# Patient Record
Sex: FEMALE | Race: WHITE | NOT HISPANIC OR LATINO | Employment: OTHER | URBAN - METROPOLITAN AREA
[De-identification: names, ages, dates, MRNs, and addresses within clinical notes are randomized per-mention and may not be internally consistent; named-entity substitution may affect disease eponyms.]

---

## 2017-01-31 RX ORDER — SAXAGLIPTIN 5 MG/1
TABLET, FILM COATED ORAL
Qty: 30 TABLET | Refills: 0 | Status: SHIPPED | OUTPATIENT
Start: 2017-01-31 | End: 2017-03-03 | Stop reason: SDUPTHER

## 2017-03-05 RX ORDER — SAXAGLIPTIN 5 MG/1
TABLET, FILM COATED ORAL
Qty: 30 TABLET | Refills: 0 | Status: SHIPPED | OUTPATIENT
Start: 2017-03-05 | End: 2017-03-31 | Stop reason: SDUPTHER

## 2017-03-27 ENCOUNTER — LAB VISIT (OUTPATIENT)
Dept: LAB | Facility: HOSPITAL | Age: 67
End: 2017-03-27
Attending: INTERNAL MEDICINE
Payer: MEDICARE

## 2017-03-27 DIAGNOSIS — E55.9 UNSPECIFIED VITAMIN D DEFICIENCY: ICD-10-CM

## 2017-03-27 DIAGNOSIS — E21.3 HYPERPARATHYROIDISM, UNSPECIFIED: ICD-10-CM

## 2017-03-27 DIAGNOSIS — M89.9 OSTEOPATHY: ICD-10-CM

## 2017-03-27 DIAGNOSIS — E04.1 THYROID NODULE: ICD-10-CM

## 2017-03-27 LAB
25(OH)D3+25(OH)D2 SERPL-MCNC: 21 NG/ML
ALBUMIN SERPL BCP-MCNC: 3.5 G/DL
ALP SERPL-CCNC: 53 U/L
ALT SERPL W/O P-5'-P-CCNC: 15 U/L
ANION GAP SERPL CALC-SCNC: 10 MMOL/L
AST SERPL-CCNC: 15 U/L
BILIRUB SERPL-MCNC: 0.5 MG/DL
BUN SERPL-MCNC: 24 MG/DL
CALCIUM SERPL-MCNC: 10.4 MG/DL
CHLORIDE SERPL-SCNC: 105 MMOL/L
CO2 SERPL-SCNC: 26 MMOL/L
CORTIS SERPL-MCNC: 10.3 UG/DL
CREAT SERPL-MCNC: 1 MG/DL
EST. GFR  (AFRICAN AMERICAN): >60 ML/MIN/1.73 M^2
EST. GFR  (NON AFRICAN AMERICAN): 57.6 ML/MIN/1.73 M^2
GLUCOSE SERPL-MCNC: 103 MG/DL
POTASSIUM SERPL-SCNC: 3.6 MMOL/L
PROT SERPL-MCNC: 6.8 G/DL
PTH-INTACT SERPL-MCNC: 109 PG/ML
SODIUM SERPL-SCNC: 141 MMOL/L
T4 FREE SERPL-MCNC: 1.02 NG/DL
TSH SERPL DL<=0.005 MIU/L-ACNC: 2.74 UIU/ML

## 2017-03-27 PROCEDURE — 82024 ASSAY OF ACTH: CPT

## 2017-03-27 PROCEDURE — 83970 ASSAY OF PARATHORMONE: CPT

## 2017-03-27 PROCEDURE — 82306 VITAMIN D 25 HYDROXY: CPT

## 2017-03-27 PROCEDURE — 80053 COMPREHEN METABOLIC PANEL: CPT

## 2017-03-27 PROCEDURE — 36415 COLL VENOUS BLD VENIPUNCTURE: CPT | Mod: PO

## 2017-03-27 PROCEDURE — 84439 ASSAY OF FREE THYROXINE: CPT

## 2017-03-27 PROCEDURE — 84443 ASSAY THYROID STIM HORMONE: CPT

## 2017-03-27 PROCEDURE — 82533 TOTAL CORTISOL: CPT

## 2017-03-28 LAB
ACTH PLAS-MCNC: 54 PG/ML
CALCIT SERPL-MCNC: <5 PG/ML

## 2017-03-31 RX ORDER — SAXAGLIPTIN 5 MG/1
TABLET, FILM COATED ORAL
Qty: 30 TABLET | Refills: 0 | Status: SHIPPED | OUTPATIENT
Start: 2017-03-31 | End: 2017-05-04 | Stop reason: SDUPTHER

## 2017-04-20 RX ORDER — ATORVASTATIN CALCIUM 20 MG/1
TABLET, FILM COATED ORAL
Qty: 30 TABLET | Refills: 12 | Status: SHIPPED | OUTPATIENT
Start: 2017-04-20 | End: 2018-01-26 | Stop reason: SDUPTHER

## 2017-05-04 RX ORDER — SAXAGLIPTIN 5 MG/1
TABLET, FILM COATED ORAL
Qty: 30 TABLET | Refills: 6 | Status: SHIPPED | OUTPATIENT
Start: 2017-05-04 | End: 2017-10-28 | Stop reason: SDUPTHER

## 2017-06-16 DIAGNOSIS — E11.9 TYPE 2 DIABETES MELLITUS WITHOUT COMPLICATION: ICD-10-CM

## 2017-08-02 ENCOUNTER — OFFICE VISIT (OUTPATIENT)
Dept: FAMILY MEDICINE | Facility: CLINIC | Age: 67
End: 2017-08-02
Payer: MEDICARE

## 2017-08-02 ENCOUNTER — LAB VISIT (OUTPATIENT)
Dept: LAB | Facility: HOSPITAL | Age: 67
End: 2017-08-02
Attending: NURSE PRACTITIONER
Payer: MEDICARE

## 2017-08-02 VITALS
HEIGHT: 60 IN | DIASTOLIC BLOOD PRESSURE: 68 MMHG | HEART RATE: 57 BPM | OXYGEN SATURATION: 97 % | SYSTOLIC BLOOD PRESSURE: 130 MMHG | WEIGHT: 229.06 LBS | TEMPERATURE: 98 F | BODY MASS INDEX: 44.97 KG/M2

## 2017-08-02 DIAGNOSIS — Z11.59 NEED FOR HEPATITIS C SCREENING TEST: ICD-10-CM

## 2017-08-02 DIAGNOSIS — R59.0 CERVICAL ADENOPATHY: Primary | ICD-10-CM

## 2017-08-02 DIAGNOSIS — E55.9 UNSPECIFIED VITAMIN D DEFICIENCY: ICD-10-CM

## 2017-08-02 DIAGNOSIS — R21 RASH: ICD-10-CM

## 2017-08-02 DIAGNOSIS — E21.3 HYPERPARATHYROIDISM: ICD-10-CM

## 2017-08-02 DIAGNOSIS — M81.0 OSTEOPOROSIS: ICD-10-CM

## 2017-08-02 DIAGNOSIS — E04.1 THYROID NODULE: ICD-10-CM

## 2017-08-02 DIAGNOSIS — E21.3 HYPERPARATHYROIDISM, UNSPECIFIED: ICD-10-CM

## 2017-08-02 DIAGNOSIS — R30.0 DYSURIA: ICD-10-CM

## 2017-08-02 LAB
25(OH)D3+25(OH)D2 SERPL-MCNC: 30 NG/ML
ALBUMIN SERPL BCP-MCNC: 3.6 G/DL
ALP SERPL-CCNC: 49 U/L
ALT SERPL W/O P-5'-P-CCNC: 17 U/L
ANION GAP SERPL CALC-SCNC: 10 MMOL/L
AST SERPL-CCNC: 13 U/L
BILIRUB SERPL-MCNC: 0.6 MG/DL
BUN SERPL-MCNC: 36 MG/DL
CALCIUM SERPL-MCNC: 10.9 MG/DL
CHLORIDE SERPL-SCNC: 103 MMOL/L
CHOLEST/HDLC SERPL: 4.6 {RATIO}
CO2 SERPL-SCNC: 27 MMOL/L
CORTIS SERPL-MCNC: 12.1 UG/DL
CREAT SERPL-MCNC: 1.1 MG/DL
EST. GFR  (AFRICAN AMERICAN): 59.2 ML/MIN/1.73 M^2
EST. GFR  (NON AFRICAN AMERICAN): 51.3 ML/MIN/1.73 M^2
GLUCOSE SERPL-MCNC: 144 MG/DL
HDL/CHOLESTEROL RATIO: 22 %
HDLC SERPL-MCNC: 173 MG/DL
HDLC SERPL-MCNC: 38 MG/DL
LDLC SERPL CALC-MCNC: 101.8 MG/DL
NONHDLC SERPL-MCNC: 135 MG/DL
POTASSIUM SERPL-SCNC: 4.5 MMOL/L
PROT SERPL-MCNC: 6.9 G/DL
PTH-INTACT SERPL-MCNC: 136 PG/ML
SODIUM SERPL-SCNC: 140 MMOL/L
T4 FREE SERPL-MCNC: 1.01 NG/DL
TRIGL SERPL-MCNC: 166 MG/DL
TSH SERPL DL<=0.005 MIU/L-ACNC: 1.63 UIU/ML

## 2017-08-02 PROCEDURE — 4010F ACE/ARB THERAPY RXD/TAKEN: CPT | Mod: ,,, | Performed by: NURSE PRACTITIONER

## 2017-08-02 PROCEDURE — 84443 ASSAY THYROID STIM HORMONE: CPT

## 2017-08-02 PROCEDURE — 84439 ASSAY OF FREE THYROXINE: CPT

## 2017-08-02 PROCEDURE — 83970 ASSAY OF PARATHORMONE: CPT

## 2017-08-02 PROCEDURE — 80061 LIPID PANEL: CPT

## 2017-08-02 PROCEDURE — 82306 VITAMIN D 25 HYDROXY: CPT

## 2017-08-02 PROCEDURE — 80053 COMPREHEN METABOLIC PANEL: CPT

## 2017-08-02 PROCEDURE — 99999 PR PBB SHADOW E&M-EST. PATIENT-LVL IV: CPT | Mod: PBBFAC,,, | Performed by: NURSE PRACTITIONER

## 2017-08-02 PROCEDURE — 1159F MED LIST DOCD IN RCRD: CPT | Mod: ,,, | Performed by: NURSE PRACTITIONER

## 2017-08-02 PROCEDURE — 82533 TOTAL CORTISOL: CPT

## 2017-08-02 PROCEDURE — 83036 HEMOGLOBIN GLYCOSYLATED A1C: CPT

## 2017-08-02 PROCEDURE — 99214 OFFICE O/P EST MOD 30 MIN: CPT | Mod: S$PBB,,, | Performed by: NURSE PRACTITIONER

## 2017-08-02 PROCEDURE — 82024 ASSAY OF ACTH: CPT

## 2017-08-02 PROCEDURE — 3045F PR MOST RECENT HEMOGLOBIN A1C LEVEL 7.0-9.0%: CPT | Mod: ,,, | Performed by: NURSE PRACTITIONER

## 2017-08-02 RX ORDER — TRIAMCINOLONE ACETONIDE 1 MG/G
0.1 CREAM TOPICAL 2 TIMES DAILY
COMMUNITY
End: 2023-04-27

## 2017-08-02 RX ORDER — CLOBETASOL PROPIONATE 0.46 MG/ML
0.05 SOLUTION TOPICAL 2 TIMES DAILY
COMMUNITY
End: 2024-01-19 | Stop reason: SDUPTHER

## 2017-08-02 RX ORDER — SULFAMETHOXAZOLE AND TRIMETHOPRIM 800; 160 MG/1; MG/1
1 TABLET ORAL 2 TIMES DAILY
Qty: 14 TABLET | Refills: 0 | Status: SHIPPED | OUTPATIENT
Start: 2017-08-02 | End: 2017-08-09

## 2017-08-02 NOTE — PROGRESS NOTES
This dictation has been generated using Dragon Dictation some phonetic errors may occur.     Andrade CARMONA was seen today for glands in neck swollen and urinary tract infection.    Diagnoses and all orders for this visit:    Cervical adenopathy  Reactive lymph node due to rash.  Continue to monitor.  Rash  Rash on neck predominantly eczema.  Continue current therapy from Dr. Barajas.  Rash on the chest suspicious for shingles because of pattern however no other convincing symptoms for shingles.  She will be on a antibiotic for a urinary tract infection so we will see if the rash responds to that.  Discontinue the steroid creams.  Steroid creams seem to have worsened per patient report.    Dysuria  -     Urinalysis; Future  -     Urine culture; Future  Dysuria.  Therapy for UTI.  Check urinalysis and urine culture as above.  I'll review results when available.    Need for hepatitis C screening test  -     Hepatitis C antibody; Future  Patient asked about doing hepatitis C tests.  She is low risk.  Her age group is at risk for development of hepatitis C.    Uncontrolled type 2 diabetes mellitus with stage 3 chronic kidney disease, with long-term current use of insulin  -     Protein / creatinine ratio, urine; Future  Uncontrolled diabetes with chronic kidney disease.  Check protein level as above.  I'll review and address accordingly.  Follows with endocrine Dr. Martínez.  She states she has labs placed and we'll get those done at today's visit.    Hyperparathyroidism  Hyperparathyroidism secondary to renal disease.  Continue to monitor.  Patient follows with endocrinology.  Other orders  -     sulfamethoxazole-trimethoprim 800-160mg (BACTRIM DS) 800-160 mg Tab; Take 1 tablet by mouth 2 (two) times daily.      Follow-up with me in one week and reevaluate rash.  Also reviewed labs with patient.    Return in about 1 week (around  8/9/2017).      ________________________________________________________________  ________________________________________________________________        Chief Complaint   Patient presents with    glands in neck swollen    Urinary Tract Infection     burning     History of present illness  This 67 y.o. presents today for complaint of multiple complaints.  Patient notes some gland swelling, rash on the neck, burning with urination, rash on the left chest wall, and chronic medical disease.  Patient is gland swelling on the posterior neck.  Symptoms have been present for a couple of months.  She had a rash problem on the back of the neck.  She saw dermatology Dr. Barajas and was prescribed some steroid creams.  The rash is improving but not resolved.  The glands have become smaller but are still present.  She notes one posterior cervical lymph node on the right and one in the scalp on the left.  Patient also notes a rash on the left chest wall.  She had been putting steroids on the affected area.  She indicates this become more red and started to itch.  She denies any burning or pain.  She has had shingles before but states this feels different.  Burning with urination.  Patient notes urinary frequency and dysuria.  She's had some urinary incontinence.  She was taking a medication for overactive bladder which has not been helpful however recent symptoms consistent with UTI.  I explained to her that no medicine would be effective if she indeed has a urinary tract infection.  We will be adjusting that medication today we can discuss this at a later point.  We'll need to evaluate her urine and we'll treat her with an antibiotic empirically.  Patient has a history of diabetes.  She follows with Dr. Martínez who has ordered CMP, lipid, A1c, free T4, TSH, ACTH, cortisol, PTH, vitamin D 25, and calcitonin.  Patient indicates that she goes down stairs and they always link the labs.  She does see Dr. Hadley for her eyes.   [facility].  ROS:   CONST: weight stable.  EYES: no vision change.  ENT: No sore throat, headache, or earache.  CV: no chest pain w/ exertion.  RESP: No shortness of breath.  GI: no nausea, vomiting, diarrhea. No dysphagia. Appetite good.   : As stated above.  MUSCULOSKELETAL: no new myalgias or arthralgias.  SKIN: Rash as above no rash elsewhere.  NEURO: no focal deficits. Denies headache.  PSYCH: no new issues.  ENDOCRINE: no polyuria.  HEME: no lymph nodes.  ALLERGY: no general pruritis.      Past medical and social history reviewed    Past Medical History:   Diagnosis Date    Anticoagulant long-term use     Plavix    Arthritis     Diabetes mellitus type II     Diabetes mellitus with renal manifestations, uncontrolled 5/5/2015    Edema leg     Chronic    High-density lipoprotein deficiency 2/24/2014    History of acute post-streptococcal glomerulonephritis     Hyperlipidemia     Hyperparathyroidism 2/24/2014    Hypertension     Obesity     Sleep apnea     Stroke approx 2013    CVA or TIA--    Thyroid disease     Vitamin D deficiency disease 2/24/2014       Past Surgical History:   Procedure Laterality Date    APPENDECTOMY      HYSTERECTOMY      JOINT REPLACEMENT Right     knee    rectum surgery      to stop bleeding    Sleep apnea Surgery      THYROIDECTOMY, PARTIAL  4/2005    80% of the Left and All of the Right    TONSILLECTOMY         Family History   Problem Relation Age of Onset    Cancer Mother      Breast    Cancer Father      Stomach       Social History     Social History    Marital status:      Spouse name: N/A    Number of children: N/A    Years of education: N/A     Social History Main Topics    Smoking status: Never Smoker    Smokeless tobacco: Never Used    Alcohol use Yes      Comment: rarely    Drug use: No    Sexual activity: Not Asked     Other Topics Concern    None     Social History Narrative    None       Current Outpatient Prescriptions  "  Medication Sig Dispense Refill    alendronate (FOSAMAX) 70 MG tablet TAKE 1 TABLET ONCE PER WEEK  1    atorvastatin (LIPITOR) 20 MG tablet TAKE 1 TABLET(20 MG) BY MOUTH EVERY DAY 30 tablet 12    BD ULTRA-FINE REYNA PEN NEEDLES 32 x 5/32 " Ndle       CARTIA  mg 24 hr capsule TAKE 1 CAPSULE BY MOUTH EVERY DAY 30 capsule 12    clobetasol (TEMOVATE) 0.05 % external solution Apply 0.05 % topically 2 (two) times daily.      clopidogrel (PLAVIX) 75 mg tablet Take 1 tablet (75 mg total) by mouth once daily. 30 tablet 12    CONTOUR TEST STRIPS Strp   4    ergocalciferol (ERGOCALCIFEROL) 50,000 unit Cap Take 50,000 Units by mouth every 7 days.       glipiZIDE (GLUCOTROL) 10 MG TR24 Take 1 tablet (10 mg total) by mouth once daily. 30 tablet 3    hydrochlorothiazide (HYDRODIURIL) 25 MG tablet Take 1 tablet (25 mg total) by mouth once daily. 30 tablet 12    insulin detemir (LEVEMIR FLEXPEN) 100 unit/mL (3 mL) SubQ InPn pen Inject 10-15 units nightly 100 mL 12    insulin lispro (HUMALOG) 100 unit/mL injection Inject into the skin 2 (two) times daily with meals. Uses a sliding scale      omega-3 fatty acids-vitamin E (FISH OIL) 1,000 mg Cap Take 1 capsule by mouth 3 (three) times daily. 1 Capsule Oral Twice a day (Patient taking differently: Take 2 capsules by mouth Daily. 1 Capsule Oral Twice a day) 90 each 0    ONGLYZA 5 mg Tab tablet TAKE 1 TABLET(5 MG) BY MOUTH EVERY DAY 30 tablet 6    oxybutynin (DITROPAN-XL) 5 MG TR24 Take 1 tablet (5 mg total) by mouth once daily. 30 tablet 11    telmisartan (MICARDIS) 80 MG Tab Take 1 tablet (80 mg total) by mouth once daily. 30 tablet 12    tramadol (ULTRAM) 50 mg tablet TAKE 1 TO 2 TABLET BY MOUTH EVERY 6 HOURS AS NEEDED 60 tablet 3    triamcinolone acetonide 0.1% (KENALOG) 0.1 % cream Apply 0.1 each topically 2 (two) times daily.      sulfamethoxazole-trimethoprim 800-160mg (BACTRIM DS) 800-160 mg Tab Take 1 tablet by mouth 2 (two) times daily. 14 tablet 0 "     No current facility-administered medications for this visit.        Review of patient's allergies indicates:  No Known Allergies    Physical examination  Vitals Reviewed  Gen. Well-dressed well-nourished no apparent distress  Skin warm dry and intact.  Trace rash noted at the base of the skull involving the scalp.  Also rash noted on the left chest wall.  He does have a linear pattern.  It is slightly erythematous.  Doesn't really have an annular pattern.  Neck is supple with one posterior cervical node noted on the right.  Very small.  Chest.  Respirations are even unlabored.  Lungs are clear to auscultation.  Cardiac regular rate and rhythm.  1 left axillary node noted tender and mobile.  No adenopathy noted above or below the clavicle.  Neuro. Awake alert oriented x4.  Normal judgment and cognition noted.  Extremities no clubbing cyanosis or edema noted.     Call or return to clinic prn if these symptoms worsen or fail to improve as anticipated.

## 2017-08-03 LAB
ESTIMATED AVG GLUCOSE: 163 MG/DL
HBA1C MFR BLD HPLC: 7.3 %

## 2017-08-04 ENCOUNTER — TELEPHONE (OUTPATIENT)
Dept: ADMINISTRATIVE | Facility: HOSPITAL | Age: 67
End: 2017-08-04

## 2017-08-04 LAB
ACTH PLAS-MCNC: 60 PG/ML
CALCIT SERPL-MCNC: <5 PG/ML

## 2017-08-04 NOTE — TELEPHONE ENCOUNTER
Received from [facility], Joselyn Hadley MD appointment 3/15/2017 diabetic eye exam.  Updated health maintenance and sent to scanning.

## 2017-08-07 ENCOUNTER — TELEPHONE (OUTPATIENT)
Dept: FAMILY MEDICINE | Facility: CLINIC | Age: 67
End: 2017-08-07

## 2017-08-07 NOTE — TELEPHONE ENCOUNTER
Urine test shows UTI from E.Coli. Bactrim is noted to be good choice on sensitivities. Complete med.

## 2017-08-09 ENCOUNTER — OFFICE VISIT (OUTPATIENT)
Dept: FAMILY MEDICINE | Facility: CLINIC | Age: 67
End: 2017-08-09
Payer: MEDICARE

## 2017-08-09 VITALS
HEART RATE: 89 BPM | WEIGHT: 227.75 LBS | DIASTOLIC BLOOD PRESSURE: 52 MMHG | SYSTOLIC BLOOD PRESSURE: 118 MMHG | HEIGHT: 60 IN | BODY MASS INDEX: 44.72 KG/M2 | OXYGEN SATURATION: 94 %

## 2017-08-09 DIAGNOSIS — N39.0 URINARY TRACT INFECTION WITHOUT HEMATURIA, SITE UNSPECIFIED: Primary | ICD-10-CM

## 2017-08-09 DIAGNOSIS — R21 RASH: ICD-10-CM

## 2017-08-09 PROCEDURE — 99213 OFFICE O/P EST LOW 20 MIN: CPT | Mod: S$PBB,,, | Performed by: NURSE PRACTITIONER

## 2017-08-09 PROCEDURE — 1159F MED LIST DOCD IN RCRD: CPT | Mod: ,,, | Performed by: NURSE PRACTITIONER

## 2017-08-09 PROCEDURE — 3074F SYST BP LT 130 MM HG: CPT | Mod: ,,, | Performed by: NURSE PRACTITIONER

## 2017-08-09 PROCEDURE — 99214 OFFICE O/P EST MOD 30 MIN: CPT | Mod: PBBFAC,PO | Performed by: NURSE PRACTITIONER

## 2017-08-09 PROCEDURE — 3008F BODY MASS INDEX DOCD: CPT | Mod: ,,, | Performed by: NURSE PRACTITIONER

## 2017-08-09 PROCEDURE — 3078F DIAST BP <80 MM HG: CPT | Mod: ,,, | Performed by: NURSE PRACTITIONER

## 2017-08-09 PROCEDURE — 99999 PR PBB SHADOW E&M-EST. PATIENT-LVL IV: CPT | Mod: PBBFAC,,, | Performed by: NURSE PRACTITIONER

## 2017-08-09 NOTE — PROGRESS NOTES
This dictation has been generated using Dragon Dictation some phonetic errors may occur.     Andrade CARMONA was seen today for rash.    Diagnoses and all orders for this visit:    Urinary tract infection without hematuria, site unspecified    Rash      UTI resolved  Rash improving  Topical Neosporin and continue to monitor for week.  Follow-up if symptoms aren't resolved.    Return if symptoms worsen or fail to improve.      ________________________________________________________________  ________________________________________________________________        Chief Complaint   Patient presents with    Rash     History of present illness  This 67 y.o. presents today for complaint of follow-up on rash.  I saw this patient recently.  She had a rash on her neck and chest.  She indicates that the rash is resolved on the  the neck.  Gland swelling on the neck is resolved.   rash on the chest wall has improved dramatically.  She stopped using the steroid cream.  She completes the antibiotic today.  I suggested she use in his port on the affected area and see how that improves over the next week.  Review of systems  No fever or chills  No oral vaginal lesions  No new rash    Past medical and social history reviewed     Past Medical History:   Diagnosis Date    Anticoagulant long-term use     Plavix    Arthritis     Diabetes mellitus type II     Diabetes mellitus with renal manifestations, uncontrolled 5/5/2015    Edema leg     Chronic    High-density lipoprotein deficiency 2/24/2014    History of acute post-streptococcal glomerulonephritis     Hyperlipidemia     Hyperparathyroidism 2/24/2014    Hypertension     Obesity     Sleep apnea     Stroke approx 2013    CVA or TIA--    Thyroid disease     Vitamin D deficiency disease 2/24/2014       Past Surgical History:   Procedure Laterality Date    APPENDECTOMY      HYSTERECTOMY      JOINT REPLACEMENT Right     knee    rectum surgery      to stop bleeding     "Sleep apnea Surgery      THYROIDECTOMY, PARTIAL  4/2005    80% of the Left and All of the Right    TONSILLECTOMY         Family History   Problem Relation Age of Onset    Cancer Mother      Breast    Cancer Father      Stomach       Social History     Social History    Marital status:      Spouse name: N/A    Number of children: N/A    Years of education: N/A     Social History Main Topics    Smoking status: Never Smoker    Smokeless tobacco: Never Used    Alcohol use Yes      Comment: rarely    Drug use: No    Sexual activity: Not Asked     Other Topics Concern    None     Social History Narrative    None       Current Outpatient Prescriptions   Medication Sig Dispense Refill    alendronate (FOSAMAX) 70 MG tablet TAKE 1 TABLET ONCE PER WEEK  1    atorvastatin (LIPITOR) 20 MG tablet TAKE 1 TABLET(20 MG) BY MOUTH EVERY DAY 30 tablet 12    BD ULTRA-FINE REYNA PEN NEEDLES 32 x 5/32 " Ndle       CARTIA  mg 24 hr capsule TAKE 1 CAPSULE BY MOUTH EVERY DAY 30 capsule 12    clobetasol (TEMOVATE) 0.05 % external solution Apply 0.05 % topically 2 (two) times daily.      clopidogrel (PLAVIX) 75 mg tablet Take 1 tablet (75 mg total) by mouth once daily. 30 tablet 12    CONTOUR TEST STRIPS Strp   4    ergocalciferol (ERGOCALCIFEROL) 50,000 unit Cap Take 50,000 Units by mouth every 7 days.       glipiZIDE (GLUCOTROL) 10 MG TR24 Take 1 tablet (10 mg total) by mouth once daily. 30 tablet 3    hydrochlorothiazide (HYDRODIURIL) 25 MG tablet Take 1 tablet (25 mg total) by mouth once daily. 30 tablet 12    insulin detemir (LEVEMIR FLEXPEN) 100 unit/mL (3 mL) SubQ InPn pen Inject 10-15 units nightly 100 mL 12    insulin lispro (HUMALOG) 100 unit/mL injection Inject into the skin 2 (two) times daily with meals. Uses a sliding scale      omega-3 fatty acids-vitamin E (FISH OIL) 1,000 mg Cap Take 1 capsule by mouth 3 (three) times daily. 1 Capsule Oral Twice a day (Patient taking differently: Take 2 " capsules by mouth Daily. 1 Capsule Oral Twice a day) 90 each 0    ONGLYZA 5 mg Tab tablet TAKE 1 TABLET(5 MG) BY MOUTH EVERY DAY 30 tablet 6    oxybutynin (DITROPAN-XL) 5 MG TR24 Take 1 tablet (5 mg total) by mouth once daily. 30 tablet 11    sulfamethoxazole-trimethoprim 800-160mg (BACTRIM DS) 800-160 mg Tab Take 1 tablet by mouth 2 (two) times daily. 14 tablet 0    telmisartan (MICARDIS) 80 MG Tab Take 1 tablet (80 mg total) by mouth once daily. 30 tablet 12    tramadol (ULTRAM) 50 mg tablet TAKE 1 TO 2 TABLET BY MOUTH EVERY 6 HOURS AS NEEDED 60 tablet 3    triamcinolone acetonide 0.1% (KENALOG) 0.1 % cream Apply 0.1 each topically 2 (two) times daily.       No current facility-administered medications for this visit.        Review of patient's allergies indicates:  No Known Allergies    Physical examination  Vitals Reviewed  Gen. Well-dressed well-nourished no apparent distress  Skin warm dry and intact.  No rashes noted on the neck.  Decrease in the erythematous presentation of the rash on the left chest wall.  Overall improvement noted.  Neck is supple without adenopathy  Chest.  Respirations are even unlabored.  Lungs are clear to auscultation.  Cardiac regular rate and rhythm.  No chest wall adenopathy noted.  Neuro. Awake alert oriented x4.  Normal judgment and cognition noted.  Extremities no clubbing cyanosis or edema noted.     Call or return to clinic prn if these symptoms worsen or fail to improve as anticipated.

## 2017-09-06 RX ORDER — DILTIAZEM HYDROCHLORIDE 240 MG/1
CAPSULE, EXTENDED RELEASE ORAL
Qty: 30 CAPSULE | Refills: 0 | Status: SHIPPED | OUTPATIENT
Start: 2017-09-06 | End: 2017-09-30 | Stop reason: SDUPTHER

## 2017-10-02 RX ORDER — DILTIAZEM HYDROCHLORIDE 240 MG/1
CAPSULE, EXTENDED RELEASE ORAL
Qty: 30 CAPSULE | Refills: 0 | Status: SHIPPED | OUTPATIENT
Start: 2017-10-02 | End: 2017-10-28 | Stop reason: SDUPTHER

## 2017-10-28 RX ORDER — CLOPIDOGREL BISULFATE 75 MG/1
TABLET ORAL
Qty: 30 TABLET | Refills: 0 | Status: SHIPPED | OUTPATIENT
Start: 2017-10-28 | End: 2017-11-30 | Stop reason: SDUPTHER

## 2017-10-28 RX ORDER — TELMISARTAN 80 MG/1
TABLET ORAL
Qty: 30 TABLET | Refills: 0 | Status: SHIPPED | OUTPATIENT
Start: 2017-10-28 | End: 2017-11-30 | Stop reason: SDUPTHER

## 2017-10-28 RX ORDER — DILTIAZEM HYDROCHLORIDE 240 MG/1
CAPSULE, EXTENDED RELEASE ORAL
Qty: 30 CAPSULE | Refills: 0 | Status: SHIPPED | OUTPATIENT
Start: 2017-10-28 | End: 2017-11-30 | Stop reason: SDUPTHER

## 2017-10-29 RX ORDER — SAXAGLIPTIN 5 MG/1
TABLET, FILM COATED ORAL
Qty: 30 TABLET | Refills: 0 | Status: SHIPPED | OUTPATIENT
Start: 2017-10-29 | End: 2018-01-11 | Stop reason: SDUPTHER

## 2017-11-30 RX ORDER — DILTIAZEM HYDROCHLORIDE 240 MG/1
CAPSULE, EXTENDED RELEASE ORAL
Qty: 30 CAPSULE | Refills: 0 | Status: SHIPPED | OUTPATIENT
Start: 2017-11-30 | End: 2017-12-23 | Stop reason: SDUPTHER

## 2017-11-30 RX ORDER — TELMISARTAN 80 MG/1
TABLET ORAL
Qty: 30 TABLET | Refills: 0 | Status: SHIPPED | OUTPATIENT
Start: 2017-11-30 | End: 2018-01-24 | Stop reason: SDUPTHER

## 2017-11-30 RX ORDER — CLOPIDOGREL BISULFATE 75 MG/1
TABLET ORAL
Qty: 30 TABLET | Refills: 0 | Status: SHIPPED | OUTPATIENT
Start: 2017-11-30 | End: 2018-01-24 | Stop reason: SDUPTHER

## 2017-12-05 RX ORDER — HYDROCHLOROTHIAZIDE 25 MG/1
TABLET ORAL
Qty: 30 TABLET | Refills: 0 | Status: SHIPPED | OUTPATIENT
Start: 2017-12-05 | End: 2018-03-21 | Stop reason: SDUPTHER

## 2017-12-13 ENCOUNTER — LAB VISIT (OUTPATIENT)
Dept: LAB | Facility: HOSPITAL | Age: 67
End: 2017-12-13
Attending: INTERNAL MEDICINE
Payer: MEDICARE

## 2017-12-13 DIAGNOSIS — E21.3 HPTH (HYPERPARATHYROIDISM): ICD-10-CM

## 2017-12-13 DIAGNOSIS — E83.52 HYPERCALCEMIA: ICD-10-CM

## 2017-12-13 DIAGNOSIS — E11.65 TYPE 2 DIABETES MELLITUS WITH HYPERGLYCEMIA: Primary | ICD-10-CM

## 2017-12-13 DIAGNOSIS — E55.9 VITAMIN D DEFICIENCY: ICD-10-CM

## 2017-12-13 LAB
25(OH)D3+25(OH)D2 SERPL-MCNC: 27 NG/ML
ALBUMIN SERPL BCP-MCNC: 3.6 G/DL
ALP SERPL-CCNC: 47 U/L
ALT SERPL W/O P-5'-P-CCNC: 17 U/L
ANION GAP SERPL CALC-SCNC: 11 MMOL/L
AST SERPL-CCNC: 13 U/L
BILIRUB SERPL-MCNC: 0.5 MG/DL
BUN SERPL-MCNC: 38 MG/DL
CALCIUM SERPL-MCNC: 10.9 MG/DL
CHLORIDE SERPL-SCNC: 101 MMOL/L
CHOLEST SERPL-MCNC: 174 MG/DL
CHOLEST/HDLC SERPL: 4.7 {RATIO}
CO2 SERPL-SCNC: 26 MMOL/L
CREAT SERPL-MCNC: 1.2 MG/DL
EST. GFR  (AFRICAN AMERICAN): 53.3 ML/MIN/1.73 M^2
EST. GFR  (NON AFRICAN AMERICAN): 46.2 ML/MIN/1.73 M^2
ESTIMATED AVG GLUCOSE: 166 MG/DL
GLUCOSE SERPL-MCNC: 176 MG/DL
HBA1C MFR BLD HPLC: 7.4 %
HDLC SERPL-MCNC: 37 MG/DL
HDLC SERPL: 21.3 %
LDLC SERPL CALC-MCNC: 102.8 MG/DL
NONHDLC SERPL-MCNC: 137 MG/DL
POTASSIUM SERPL-SCNC: 3.9 MMOL/L
PROT SERPL-MCNC: 7.2 G/DL
PTH-INTACT SERPL-MCNC: 111 PG/ML
SODIUM SERPL-SCNC: 138 MMOL/L
T4 FREE SERPL-MCNC: 1.06 NG/DL
TRIGL SERPL-MCNC: 171 MG/DL
TSH SERPL DL<=0.005 MIU/L-ACNC: 1.31 UIU/ML

## 2017-12-13 PROCEDURE — 80053 COMPREHEN METABOLIC PANEL: CPT

## 2017-12-13 PROCEDURE — 84443 ASSAY THYROID STIM HORMONE: CPT

## 2017-12-13 PROCEDURE — 80061 LIPID PANEL: CPT

## 2017-12-13 PROCEDURE — 83970 ASSAY OF PARATHORMONE: CPT

## 2017-12-13 PROCEDURE — 84439 ASSAY OF FREE THYROXINE: CPT

## 2017-12-13 PROCEDURE — 83036 HEMOGLOBIN GLYCOSYLATED A1C: CPT

## 2017-12-13 PROCEDURE — 82306 VITAMIN D 25 HYDROXY: CPT

## 2017-12-13 PROCEDURE — 36415 COLL VENOUS BLD VENIPUNCTURE: CPT | Mod: PO

## 2017-12-25 RX ORDER — DILTIAZEM HYDROCHLORIDE 240 MG/1
CAPSULE, EXTENDED RELEASE ORAL
Qty: 30 CAPSULE | Refills: 0 | Status: SHIPPED | OUTPATIENT
Start: 2017-12-25 | End: 2018-01-24 | Stop reason: SDUPTHER

## 2018-01-11 RX ORDER — SAXAGLIPTIN 5 MG/1
TABLET, FILM COATED ORAL
Qty: 30 TABLET | Refills: 0 | Status: SHIPPED | OUTPATIENT
Start: 2018-01-11 | End: 2019-04-23

## 2018-01-24 RX ORDER — TELMISARTAN 80 MG/1
TABLET ORAL
Qty: 30 TABLET | Refills: 0 | Status: SHIPPED | OUTPATIENT
Start: 2018-01-24 | End: 2018-01-26 | Stop reason: SDUPTHER

## 2018-01-24 RX ORDER — DILTIAZEM HYDROCHLORIDE 240 MG/1
CAPSULE, EXTENDED RELEASE ORAL
Qty: 30 CAPSULE | Refills: 3 | Status: SHIPPED | OUTPATIENT
Start: 2018-01-24 | End: 2018-01-26 | Stop reason: SDUPTHER

## 2018-01-24 RX ORDER — CLOPIDOGREL BISULFATE 75 MG/1
TABLET ORAL
Qty: 30 TABLET | Refills: 0 | Status: SHIPPED | OUTPATIENT
Start: 2018-01-24 | End: 2018-01-26 | Stop reason: SDUPTHER

## 2018-01-26 ENCOUNTER — OFFICE VISIT (OUTPATIENT)
Dept: FAMILY MEDICINE | Facility: CLINIC | Age: 68
End: 2018-01-26
Payer: MEDICARE

## 2018-01-26 DIAGNOSIS — E78.2 COMBINED HYPERLIPIDEMIA ASSOCIATED WITH TYPE 2 DIABETES MELLITUS: ICD-10-CM

## 2018-01-26 DIAGNOSIS — Z01.818 PREOPERATIVE EXAMINATION: ICD-10-CM

## 2018-01-26 DIAGNOSIS — E21.3 HYPERPARATHYROIDISM: ICD-10-CM

## 2018-01-26 DIAGNOSIS — E04.1 THYROID NODULE: Primary | ICD-10-CM

## 2018-01-26 DIAGNOSIS — G47.30 SLEEP APNEA, UNSPECIFIED TYPE: ICD-10-CM

## 2018-01-26 DIAGNOSIS — M17.10 PRIMARY LOCALIZED OSTEOARTHROSIS OF LOWER LEG, UNSPECIFIED LATERALITY: ICD-10-CM

## 2018-01-26 DIAGNOSIS — I15.2 HYPERTENSION ASSOCIATED WITH DIABETES: ICD-10-CM

## 2018-01-26 DIAGNOSIS — G47.00 INSOMNIA, UNSPECIFIED TYPE: ICD-10-CM

## 2018-01-26 DIAGNOSIS — E11.69 COMBINED HYPERLIPIDEMIA ASSOCIATED WITH TYPE 2 DIABETES MELLITUS: ICD-10-CM

## 2018-01-26 DIAGNOSIS — E11.59 HYPERTENSION ASSOCIATED WITH DIABETES: ICD-10-CM

## 2018-01-26 DIAGNOSIS — E55.9 VITAMIN D DEFICIENCY DISEASE: ICD-10-CM

## 2018-01-26 PROCEDURE — 99215 OFFICE O/P EST HI 40 MIN: CPT | Mod: S$PBB,,, | Performed by: INTERNAL MEDICINE

## 2018-01-26 RX ORDER — TELMISARTAN 80 MG/1
TABLET ORAL
Qty: 30 TABLET | Refills: 12 | Status: SHIPPED | OUTPATIENT
Start: 2018-01-26 | End: 2018-04-12 | Stop reason: SDUPTHER

## 2018-01-26 RX ORDER — TRAMADOL HYDROCHLORIDE 50 MG/1
TABLET ORAL
Qty: 60 TABLET | Refills: 5 | Status: SHIPPED | OUTPATIENT
Start: 2018-01-26 | End: 2019-09-12

## 2018-01-26 RX ORDER — ATORVASTATIN CALCIUM 20 MG/1
TABLET, FILM COATED ORAL
Qty: 30 TABLET | Refills: 12 | Status: SHIPPED | OUTPATIENT
Start: 2018-01-26 | End: 2018-03-21 | Stop reason: SDUPTHER

## 2018-01-26 RX ORDER — DILTIAZEM HYDROCHLORIDE 240 MG/1
240 CAPSULE, COATED, EXTENDED RELEASE ORAL DAILY
Qty: 30 CAPSULE | Refills: 12 | Status: SHIPPED | OUTPATIENT
Start: 2018-01-26 | End: 2018-03-21 | Stop reason: SDUPTHER

## 2018-01-26 RX ORDER — CLOPIDOGREL BISULFATE 75 MG/1
TABLET ORAL
Qty: 30 TABLET | Refills: 12 | Status: SHIPPED | OUTPATIENT
Start: 2018-01-26 | End: 2018-03-21 | Stop reason: SDUPTHER

## 2018-01-26 NOTE — PROGRESS NOTES
Chief complaint: Preop, last seen 10/16    68-year-old white female last seen 10/16.  She has uncontrolled diabetes with renal insufficiency and her last A1c was 7.4. She is however apparently followed by outside endocrine for diabetes and hyperparathyroidism.  Patient seen in consultation from her endocrinologist Dr. Martínez.  She has had a thyroid nodule on the left which has enlarged and it was recommended that she have it removed.  She has had large thyroid nodules removed from the right.  She is set to see Dr. Aguilar the surgeon who has been reluctant to perform surgery in the past.  She saw her cardiologist today and had an EKG and has a copy of that in her car.  She was cleared for surgery.  She has no underlying heart problems except for a minor heart murmur and she was followed by cardiology after her stroke at [place] in 2013.  She has an extensive prior surgical history all done without complications related to anesthesia bleeding and DVT.  I reviewed her recent lab work.  She's had no infectious or cardiopulmonary symptoms.  She will be cleared for surgery medically.  She needs refills of all her medications which were done at a refill of tramadol that she occasionally takes for knee pain.  She has not had a refill in a while.  Patient counseled at length regarding all these issues.  She was counseled regarding her hyperparathyroidism.  I do not have the details but did point out to her that she does have some ongoing mild vitamin D deficiency which could be causing her hyperparathyroidism is wellTotal time over 45 minutes with over 50% counseling.        ROS:   CONST: weight stable. EYES: no vision change. ENT: no sore throat. CV: no chest pain w/ exertion. RESP: no shortness of breath. GI: no nausea, vomiting, diarrhea. No dysphagia. : no urinary issues. MUSCULOSKELETAL: no new myalgias or arthralgias. SKIN: no new changes. NEURO: no focal deficits. PSYCH: no new issues. ENDOCRINE: no  polyuria. HEME: no lymph nodes. ALLERGY: no general pruritis.    PAST MEDICAL HISTORY:  1. Hypertension  2. Diabetes Mellitus Type II, follows with Dr. Martínez - Endocrinology  3. History of Post Streptococcal Glomerulonephritis  4. Chronic LE Edema  5. Sleep Apnea: 1st sleep study at [facility], 2nd at [facility], 3rd at [facility]  6. Obesity  7. Hyperlipidemia  8. Postmenopausal  9. Colonoscopy 2003, Dr. Stewart - [facility]  10.  MNGoiter  11. Renal insufficiency  12. Hyperparathyroidism/hypercalcemia  13. Vit D def  14. Low HDL  15. STROKE with speech deficit  at .  Now on Plavix.   16.  Right knee replacement   17.  Urinary incontinence    PAST SURGICAL HISTORY:   1. Hysterectomy  at [facility] secondary to fibroids  2. Partial Thyroidectomy 2005: 80% of Left and All of Right removed  3. Sleep Apnea surgery  4. Appendectomy  5. Tonsillectomy    SOCIAL HISTORY: No tobacco.   . Works at [employer].    FAMILY HISTORY:   1. Mom  from Breast Cancer  2. Dad  from Stomach Cancer secondary to Asbestos    Gen: no distress  EYES: conjunctiva clear, non-icteric, PERRL  ENT: nose clear, nasal mucosa normal, oropharynx clear and moist, teeth good  NECK:supple, thyroid non-palpable  RESP: effort is good, lungs clear  CV: heart RRR w/o murmur, gallops or rubs; no carotid bruits, no edema  GI: abdomen soft, non-distended, non-tender, no hepatosplenomegaly  MS: gait normal, no clubbing or cyanosis of the digits  SKIN: no rashes, warm to touch    Diagnoses and all orders for this visit:    Thyroid nodule, patient will discuss with surgeon about removal.  Also discussed seeing specialists in endocrine surgery at [facility] if needed.    Preoperative examination, medically clear for surgery    Insomnia, unspecified type, ongoing problem since her  , she may well have her cycle reversed    Primary localized osteoarthrosis of lower leg,  unspecified laterality, tramadol refilled    Uncontrolled type 2 diabetes mellitus with stage 3 chronic kidney disease, with long-term current use of insulin, followed by endocrine    Hyperparathyroidism, specifically followed by endocrine    Vitamin D deficiency disease, recently had her vitamin D increased    Hypertension associated with diabetes, chronic and stable    Combined hyperlipidemia associated with type 2 diabetes mellitus, Lipitor refilled    Sleep apnea, unspecified type, patient advised to bring her CPAP with her if she has surgery    Other orders  -     atorvastatin (LIPITOR) 20 MG tablet; TAKE 1 TABLET(20 MG) BY MOUTH EVERY DAY  -     diltiaZEM (CARTIA XT) 240 MG 24 hr capsule; Take 1 capsule (240 mg total) by mouth once daily.  -     clopidogrel (PLAVIX) 75 mg tablet; TAKE 1 TABLET(75 MG) BY MOUTH EVERY DAY  -     telmisartan (MICARDIS) 80 MG Tab; TAKE 1 TABLET(80 MG) BY MOUTH EVERY DAY  -     traMADol (ULTRAM) 50 mg tablet; 1-2 q 6hr prn

## 2018-02-19 RX ORDER — TELMISARTAN 80 MG/1
TABLET ORAL
Qty: 30 TABLET | Refills: 6 | Status: SHIPPED | OUTPATIENT
Start: 2018-02-19 | End: 2018-03-21 | Stop reason: SDUPTHER

## 2018-02-19 RX ORDER — CLOPIDOGREL BISULFATE 75 MG/1
TABLET ORAL
Qty: 30 TABLET | Refills: 6 | Status: SHIPPED | OUTPATIENT
Start: 2018-02-19 | End: 2023-04-27

## 2018-03-20 RX ORDER — INSULIN DETEMIR 100 [IU]/ML
INJECTION, SOLUTION SUBCUTANEOUS
Qty: 15 ML | Refills: 0 | Status: SHIPPED | OUTPATIENT
Start: 2018-03-20 | End: 2018-09-01 | Stop reason: SDUPTHER

## 2018-03-22 RX ORDER — CLOPIDOGREL BISULFATE 75 MG/1
TABLET ORAL
Qty: 30 TABLET | Refills: 12 | Status: SHIPPED | OUTPATIENT
Start: 2018-03-22

## 2018-03-22 RX ORDER — HYDROCHLOROTHIAZIDE 25 MG/1
TABLET ORAL
Qty: 30 TABLET | Refills: 0 | Status: SHIPPED | OUTPATIENT
Start: 2018-03-22 | End: 2019-08-27 | Stop reason: SDUPTHER

## 2018-03-22 RX ORDER — TELMISARTAN 80 MG/1
TABLET ORAL
Qty: 30 TABLET | Refills: 6 | Status: SHIPPED | OUTPATIENT
Start: 2018-03-22 | End: 2020-03-30

## 2018-03-22 RX ORDER — ATORVASTATIN CALCIUM 20 MG/1
TABLET, FILM COATED ORAL
Qty: 30 TABLET | Refills: 12 | Status: SHIPPED | OUTPATIENT
Start: 2018-03-22 | End: 2023-04-27

## 2018-03-22 RX ORDER — DILTIAZEM HYDROCHLORIDE 240 MG/1
240 CAPSULE, COATED, EXTENDED RELEASE ORAL DAILY
Qty: 30 CAPSULE | Refills: 12 | Status: SHIPPED | OUTPATIENT
Start: 2018-03-22 | End: 2018-04-12 | Stop reason: SDUPTHER

## 2018-04-09 ENCOUNTER — LAB VISIT (OUTPATIENT)
Dept: LAB | Facility: HOSPITAL | Age: 68
End: 2018-04-09
Attending: INTERNAL MEDICINE
Payer: MEDICARE

## 2018-04-09 DIAGNOSIS — E83.52 HYPERCALCEMIA: ICD-10-CM

## 2018-04-09 DIAGNOSIS — E21.3 HYPERPARATHYROIDISM, UNSPECIFIED: ICD-10-CM

## 2018-04-09 DIAGNOSIS — E55.9 AVITAMINOSIS D: ICD-10-CM

## 2018-04-09 DIAGNOSIS — D44.3 NEOPLASM OF UNCERTAIN BEHAVIOR OF PITUITARY GLAND AND CRANIOPHARYNGEAL DUCT: ICD-10-CM

## 2018-04-09 DIAGNOSIS — R94.4 NONSPECIFIC ABNORMAL RESULTS OF KIDNEY FUNCTION STUDY: ICD-10-CM

## 2018-04-09 DIAGNOSIS — I63.9 STROKE-IN-EVOLUTION SYNDROME: ICD-10-CM

## 2018-04-09 DIAGNOSIS — E04.2 NONTOXIC MULTINODULAR GOITER: ICD-10-CM

## 2018-04-09 DIAGNOSIS — E11.69 DIABETES MELLITUS ASSOCIATED WITH HORMONAL ETIOLOGY: Primary | ICD-10-CM

## 2018-04-09 DIAGNOSIS — D44.4 NEOPLASM OF UNCERTAIN BEHAVIOR OF PITUITARY GLAND AND CRANIOPHARYNGEAL DUCT: ICD-10-CM

## 2018-04-09 LAB
25(OH)D3+25(OH)D2 SERPL-MCNC: 29 NG/ML
ALBUMIN SERPL BCP-MCNC: 3.6 G/DL
ALP SERPL-CCNC: 44 U/L
ALT SERPL W/O P-5'-P-CCNC: 19 U/L
ANION GAP SERPL CALC-SCNC: 12 MMOL/L
AST SERPL-CCNC: 12 U/L
BILIRUB SERPL-MCNC: 0.7 MG/DL
BUN SERPL-MCNC: 28 MG/DL
CALCIUM SERPL-MCNC: 11.3 MG/DL
CHLORIDE SERPL-SCNC: 106 MMOL/L
CHOLEST SERPL-MCNC: 181 MG/DL
CHOLEST/HDLC SERPL: 5.8 {RATIO}
CO2 SERPL-SCNC: 22 MMOL/L
CORTIS SERPL-MCNC: 15.2 UG/DL
CREAT SERPL-MCNC: 1.2 MG/DL
EST. GFR  (AFRICAN AMERICAN): 52.9 ML/MIN/1.73 M^2
EST. GFR  (NON AFRICAN AMERICAN): 45.9 ML/MIN/1.73 M^2
ESTIMATED AVG GLUCOSE: 169 MG/DL
GLUCOSE SERPL-MCNC: 198 MG/DL
HBA1C MFR BLD HPLC: 7.5 %
HDLC SERPL-MCNC: 31 MG/DL
HDLC SERPL: 17.1 %
LDLC SERPL CALC-MCNC: 118.8 MG/DL
NONHDLC SERPL-MCNC: 150 MG/DL
POTASSIUM SERPL-SCNC: 4.1 MMOL/L
PROT SERPL-MCNC: 6.8 G/DL
PTH-INTACT SERPL-MCNC: 137 PG/ML
SODIUM SERPL-SCNC: 140 MMOL/L
TRIGL SERPL-MCNC: 156 MG/DL

## 2018-04-09 PROCEDURE — 82024 ASSAY OF ACTH: CPT

## 2018-04-09 PROCEDURE — 82306 VITAMIN D 25 HYDROXY: CPT

## 2018-04-09 PROCEDURE — 82533 TOTAL CORTISOL: CPT

## 2018-04-09 PROCEDURE — 80053 COMPREHEN METABOLIC PANEL: CPT

## 2018-04-09 PROCEDURE — 83036 HEMOGLOBIN GLYCOSYLATED A1C: CPT

## 2018-04-09 PROCEDURE — 83970 ASSAY OF PARATHORMONE: CPT

## 2018-04-09 PROCEDURE — 36415 COLL VENOUS BLD VENIPUNCTURE: CPT | Mod: PO

## 2018-04-09 PROCEDURE — 80061 LIPID PANEL: CPT

## 2018-04-10 LAB
ACTH PLAS-MCNC: 63 PG/ML
CALCIT SERPL-MCNC: <5 PG/ML

## 2018-04-12 RX ORDER — GLIPIZIDE 10 MG/1
10 TABLET, FILM COATED, EXTENDED RELEASE ORAL DAILY
Qty: 30 TABLET | Refills: 3 | Status: SHIPPED | OUTPATIENT
Start: 2018-04-12 | End: 2023-04-27

## 2018-04-12 RX ORDER — TELMISARTAN 80 MG/1
TABLET ORAL
Qty: 30 TABLET | Refills: 12 | Status: SHIPPED | OUTPATIENT
Start: 2018-04-12 | End: 2019-08-27 | Stop reason: SDUPTHER

## 2018-04-12 RX ORDER — DILTIAZEM HYDROCHLORIDE 240 MG/1
240 CAPSULE, COATED, EXTENDED RELEASE ORAL DAILY
Qty: 30 CAPSULE | Refills: 3 | Status: SHIPPED | OUTPATIENT
Start: 2018-04-12 | End: 2018-04-16 | Stop reason: SDUPTHER

## 2018-04-16 RX ORDER — DILTIAZEM HYDROCHLORIDE 240 MG/1
240 CAPSULE, COATED, EXTENDED RELEASE ORAL DAILY
Qty: 90 CAPSULE | Refills: 3 | Status: SHIPPED | OUTPATIENT
Start: 2018-04-16 | End: 2023-04-27

## 2018-05-30 RX ORDER — HYDROCHLOROTHIAZIDE 25 MG/1
TABLET ORAL
Qty: 30 TABLET | Refills: 0 | Status: SHIPPED | OUTPATIENT
Start: 2018-05-30 | End: 2018-06-24 | Stop reason: SDUPTHER

## 2018-06-25 RX ORDER — HYDROCHLOROTHIAZIDE 25 MG/1
TABLET ORAL
Qty: 30 TABLET | Refills: 0 | Status: SHIPPED | OUTPATIENT
Start: 2018-06-25 | End: 2018-07-27 | Stop reason: SDUPTHER

## 2018-07-10 ENCOUNTER — LAB VISIT (OUTPATIENT)
Dept: LAB | Facility: HOSPITAL | Age: 68
End: 2018-07-10
Attending: INTERNAL MEDICINE
Payer: MEDICARE

## 2018-07-10 DIAGNOSIS — E83.52 HYPERCALCEMIA: ICD-10-CM

## 2018-07-10 DIAGNOSIS — E21.3 HYPERPARATHYROIDISM, UNSPECIFIED: ICD-10-CM

## 2018-07-10 DIAGNOSIS — E04.2 NONTOXIC MULTINODULAR GOITER: ICD-10-CM

## 2018-07-10 DIAGNOSIS — E55.9 AVITAMINOSIS D: ICD-10-CM

## 2018-07-10 DIAGNOSIS — I63.9 STROKE-IN-EVOLUTION SYNDROME: ICD-10-CM

## 2018-07-10 LAB
25(OH)D3+25(OH)D2 SERPL-MCNC: 36 NG/ML
ALBUMIN SERPL BCP-MCNC: 3.7 G/DL
ALP SERPL-CCNC: 34 U/L
ALT SERPL W/O P-5'-P-CCNC: 20 U/L
ANION GAP SERPL CALC-SCNC: 9 MMOL/L
AST SERPL-CCNC: 16 U/L
BILIRUB SERPL-MCNC: 0.7 MG/DL
BUN SERPL-MCNC: 24 MG/DL
CALCIUM SERPL-MCNC: 10.8 MG/DL
CHLORIDE SERPL-SCNC: 104 MMOL/L
CHOLEST SERPL-MCNC: 154 MG/DL
CHOLEST/HDLC SERPL: 4.2 {RATIO}
CO2 SERPL-SCNC: 27 MMOL/L
CREAT SERPL-MCNC: 1.1 MG/DL
EST. GFR  (AFRICAN AMERICAN): 58.8 ML/MIN/1.73 M^2
EST. GFR  (NON AFRICAN AMERICAN): 51 ML/MIN/1.73 M^2
ESTIMATED AVG GLUCOSE: 169 MG/DL
GLUCOSE SERPL-MCNC: 136 MG/DL
HBA1C MFR BLD HPLC: 7.5 %
HDLC SERPL-MCNC: 37 MG/DL
HDLC SERPL: 24 %
LDLC SERPL CALC-MCNC: 88.4 MG/DL
NONHDLC SERPL-MCNC: 117 MG/DL
POTASSIUM SERPL-SCNC: 3.9 MMOL/L
PROT SERPL-MCNC: 6.8 G/DL
PTH-INTACT SERPL-MCNC: 109 PG/ML
SODIUM SERPL-SCNC: 140 MMOL/L
T4 FREE SERPL-MCNC: 1.04 NG/DL
TRIGL SERPL-MCNC: 143 MG/DL
TSH SERPL DL<=0.005 MIU/L-ACNC: 1.18 UIU/ML

## 2018-07-10 PROCEDURE — 80053 COMPREHEN METABOLIC PANEL: CPT

## 2018-07-10 PROCEDURE — 36415 COLL VENOUS BLD VENIPUNCTURE: CPT | Mod: PO

## 2018-07-10 PROCEDURE — 83036 HEMOGLOBIN GLYCOSYLATED A1C: CPT

## 2018-07-10 PROCEDURE — 82306 VITAMIN D 25 HYDROXY: CPT

## 2018-07-10 PROCEDURE — 84443 ASSAY THYROID STIM HORMONE: CPT

## 2018-07-10 PROCEDURE — 80061 LIPID PANEL: CPT

## 2018-07-10 PROCEDURE — 84439 ASSAY OF FREE THYROXINE: CPT

## 2018-07-10 PROCEDURE — 83970 ASSAY OF PARATHORMONE: CPT

## 2018-07-29 RX ORDER — HYDROCHLOROTHIAZIDE 25 MG/1
TABLET ORAL
Qty: 30 TABLET | Refills: 0 | Status: SHIPPED | OUTPATIENT
Start: 2018-07-29 | End: 2018-08-17 | Stop reason: SDUPTHER

## 2018-08-19 RX ORDER — HYDROCHLOROTHIAZIDE 25 MG/1
TABLET ORAL
Qty: 30 TABLET | Refills: 0 | Status: SHIPPED | OUTPATIENT
Start: 2018-08-19 | End: 2018-09-15 | Stop reason: SDUPTHER

## 2018-09-04 RX ORDER — INSULIN DETEMIR 100 [IU]/ML
INJECTION, SOLUTION SUBCUTANEOUS
Qty: 15 ML | Refills: 0 | Status: SHIPPED | OUTPATIENT
Start: 2018-09-04 | End: 2018-09-13 | Stop reason: SDUPTHER

## 2018-09-13 NOTE — TELEPHONE ENCOUNTER
----- Message from Uday Amato sent at 9/13/2018  9:05 AM CDT -----  Contact: self  Please send LEVEMIR FLEXTOUCH U-100 INSULN 100 unit/mL (3 mL) InPn pen [employer] [phone]. Requesting 90 day supply. Pt [phone].    Thanks-

## 2018-09-18 RX ORDER — HYDROCHLOROTHIAZIDE 25 MG/1
TABLET ORAL
Qty: 30 TABLET | Refills: 0 | Status: SHIPPED | OUTPATIENT
Start: 2018-09-18 | End: 2018-10-17 | Stop reason: SDUPTHER

## 2018-10-18 RX ORDER — HYDROCHLOROTHIAZIDE 25 MG/1
TABLET ORAL
Qty: 30 TABLET | Refills: 0 | Status: SHIPPED | OUTPATIENT
Start: 2018-10-18 | End: 2018-11-20 | Stop reason: SDUPTHER

## 2018-10-29 ENCOUNTER — LAB VISIT (OUTPATIENT)
Dept: LAB | Facility: HOSPITAL | Age: 68
End: 2018-10-29
Attending: INTERNAL MEDICINE
Payer: MEDICARE

## 2018-10-29 DIAGNOSIS — E11.69 DIABETES MELLITUS ASSOCIATED WITH HORMONAL ETIOLOGY: Primary | ICD-10-CM

## 2018-10-29 DIAGNOSIS — E04.2 NONTOXIC MULTINODULAR GOITER: ICD-10-CM

## 2018-10-29 DIAGNOSIS — E55.9 VITAMIN D DEFICIENCY DISEASE: ICD-10-CM

## 2018-10-29 DIAGNOSIS — E83.52 HYPERCALCEMIA: ICD-10-CM

## 2018-10-29 DIAGNOSIS — E21.3 HPTH (HYPERPARATHYROIDISM): ICD-10-CM

## 2018-10-29 LAB
ALBUMIN SERPL BCP-MCNC: 3.6 G/DL
ALP SERPL-CCNC: 52 U/L
ALT SERPL W/O P-5'-P-CCNC: 14 U/L
ANION GAP SERPL CALC-SCNC: 11 MMOL/L
AST SERPL-CCNC: 14 U/L
BILIRUB SERPL-MCNC: 0.5 MG/DL
BUN SERPL-MCNC: 20 MG/DL
CALCIUM SERPL-MCNC: 11.4 MG/DL
CHLORIDE SERPL-SCNC: 106 MMOL/L
CHOLEST SERPL-MCNC: 148 MG/DL
CHOLEST/HDLC SERPL: 4.1 {RATIO}
CO2 SERPL-SCNC: 25 MMOL/L
CORTIS SERPL-MCNC: 17.8 UG/DL
CREAT SERPL-MCNC: 0.9 MG/DL
EST. GFR  (AFRICAN AMERICAN): >60 ML/MIN/1.73 M^2
EST. GFR  (NON AFRICAN AMERICAN): >60 ML/MIN/1.73 M^2
ESTIMATED AVG GLUCOSE: 137 MG/DL
GLUCOSE SERPL-MCNC: 121 MG/DL
HBA1C MFR BLD HPLC: 6.4 %
HDLC SERPL-MCNC: 36 MG/DL
HDLC SERPL: 24.3 %
LDLC SERPL CALC-MCNC: 81.8 MG/DL
NONHDLC SERPL-MCNC: 112 MG/DL
POTASSIUM SERPL-SCNC: 3.8 MMOL/L
PROT SERPL-MCNC: 6.6 G/DL
PTH-INTACT SERPL-MCNC: 103 PG/ML
SODIUM SERPL-SCNC: 142 MMOL/L
T4 FREE SERPL-MCNC: 1.06 NG/DL
TRIGL SERPL-MCNC: 151 MG/DL
TSH SERPL DL<=0.005 MIU/L-ACNC: 1.63 UIU/ML

## 2018-10-29 PROCEDURE — 80061 LIPID PANEL: CPT

## 2018-10-29 PROCEDURE — 80053 COMPREHEN METABOLIC PANEL: CPT

## 2018-10-29 PROCEDURE — 83970 ASSAY OF PARATHORMONE: CPT

## 2018-10-29 PROCEDURE — 84443 ASSAY THYROID STIM HORMONE: CPT

## 2018-10-29 PROCEDURE — 83036 HEMOGLOBIN GLYCOSYLATED A1C: CPT

## 2018-10-29 PROCEDURE — 82024 ASSAY OF ACTH: CPT

## 2018-10-29 PROCEDURE — 82533 TOTAL CORTISOL: CPT

## 2018-10-29 PROCEDURE — 84439 ASSAY OF FREE THYROXINE: CPT

## 2018-10-29 PROCEDURE — 36415 COLL VENOUS BLD VENIPUNCTURE: CPT | Mod: PO

## 2018-10-30 LAB — ACTH PLAS-MCNC: 96 PG/ML

## 2018-11-20 RX ORDER — HYDROCHLOROTHIAZIDE 25 MG/1
TABLET ORAL
Qty: 30 TABLET | Refills: 0 | Status: SHIPPED | OUTPATIENT
Start: 2018-11-20 | End: 2018-12-26 | Stop reason: SDUPTHER

## 2018-12-26 RX ORDER — HYDROCHLOROTHIAZIDE 25 MG/1
TABLET ORAL
Qty: 30 TABLET | Refills: 0 | Status: SHIPPED | OUTPATIENT
Start: 2018-12-26 | End: 2019-01-25 | Stop reason: SDUPTHER

## 2019-01-25 RX ORDER — HYDROCHLOROTHIAZIDE 25 MG/1
TABLET ORAL
Qty: 30 TABLET | Refills: 12 | Status: SHIPPED | OUTPATIENT
Start: 2019-01-25 | End: 2020-02-19

## 2019-02-06 RX ORDER — TELMISARTAN 80 MG/1
TABLET ORAL
Qty: 30 TABLET | Refills: 0 | Status: SHIPPED | OUTPATIENT
Start: 2019-02-06 | End: 2019-08-27 | Stop reason: SDUPTHER

## 2019-02-06 RX ORDER — ATORVASTATIN CALCIUM 20 MG/1
TABLET, FILM COATED ORAL
Qty: 30 TABLET | Refills: 0 | Status: SHIPPED | OUTPATIENT
Start: 2019-02-06 | End: 2019-03-10 | Stop reason: SDUPTHER

## 2019-02-21 RX ORDER — CLOPIDOGREL BISULFATE 75 MG/1
TABLET ORAL
Qty: 30 TABLET | Refills: 12 | Status: SHIPPED | OUTPATIENT
Start: 2019-02-21 | End: 2019-08-27 | Stop reason: SDUPTHER

## 2019-02-22 ENCOUNTER — TELEPHONE (OUTPATIENT)
Dept: ADMINISTRATIVE | Facility: HOSPITAL | Age: 69
End: 2019-02-22

## 2019-02-22 RX ORDER — OXYCODONE AND ACETAMINOPHEN 5; 325 MG/1; MG/1
TABLET ORAL
Refills: 0 | COMMUNITY
Start: 2018-12-06 | End: 2019-09-12

## 2019-02-22 RX ORDER — AMOXICILLIN 500 MG/1
CAPSULE ORAL
Refills: 0 | COMMUNITY
Start: 2019-01-28 | End: 2019-05-29

## 2019-03-10 RX ORDER — ATORVASTATIN CALCIUM 20 MG/1
TABLET, FILM COATED ORAL
Qty: 30 TABLET | Refills: 0 | Status: SHIPPED | OUTPATIENT
Start: 2019-03-10 | End: 2019-08-27 | Stop reason: SDUPTHER

## 2019-03-22 ENCOUNTER — TELEPHONE (OUTPATIENT)
Dept: ADMINISTRATIVE | Facility: HOSPITAL | Age: 69
End: 2019-03-22

## 2019-03-22 LAB
LEFT EYE DM RETINOPATHY: NEGATIVE
RIGHT EYE DM RETINOPATHY: NEGATIVE

## 2019-04-23 ENCOUNTER — OFFICE VISIT (OUTPATIENT)
Dept: FAMILY MEDICINE | Facility: CLINIC | Age: 69
End: 2019-04-23
Payer: MEDICARE

## 2019-04-23 DIAGNOSIS — M25.512 CHRONIC LEFT SHOULDER PAIN: ICD-10-CM

## 2019-04-23 DIAGNOSIS — E11.59 HYPERTENSION ASSOCIATED WITH DIABETES: ICD-10-CM

## 2019-04-23 DIAGNOSIS — G47.00 INSOMNIA, UNSPECIFIED TYPE: ICD-10-CM

## 2019-04-23 DIAGNOSIS — E11.69 OBESITY, DIABETES, AND HYPERTENSION SYNDROME: ICD-10-CM

## 2019-04-23 DIAGNOSIS — E11.65 UNCONTROLLED TYPE 2 DIABETES MELLITUS WITH HYPERGLYCEMIA: ICD-10-CM

## 2019-04-23 DIAGNOSIS — E11.59 OBESITY, DIABETES, AND HYPERTENSION SYNDROME: ICD-10-CM

## 2019-04-23 DIAGNOSIS — Z12.11 SCREENING FOR COLORECTAL CANCER: ICD-10-CM

## 2019-04-23 DIAGNOSIS — E11.69 COMBINED HYPERLIPIDEMIA ASSOCIATED WITH TYPE 2 DIABETES MELLITUS: ICD-10-CM

## 2019-04-23 DIAGNOSIS — E04.1 THYROID NODULE: ICD-10-CM

## 2019-04-23 DIAGNOSIS — E55.9 VITAMIN D DEFICIENCY DISEASE: ICD-10-CM

## 2019-04-23 DIAGNOSIS — I15.2 OBESITY, DIABETES, AND HYPERTENSION SYNDROME: ICD-10-CM

## 2019-04-23 DIAGNOSIS — Z86.73 HISTORY OF STROKE: ICD-10-CM

## 2019-04-23 DIAGNOSIS — G47.30 SLEEP APNEA, UNSPECIFIED TYPE: ICD-10-CM

## 2019-04-23 DIAGNOSIS — E83.52 HYPERCALCEMIA: ICD-10-CM

## 2019-04-23 DIAGNOSIS — I15.2 HYPERTENSION ASSOCIATED WITH DIABETES: ICD-10-CM

## 2019-04-23 DIAGNOSIS — Z12.31 ENCOUNTER FOR SCREENING MAMMOGRAM FOR BREAST CANCER: ICD-10-CM

## 2019-04-23 DIAGNOSIS — E21.3 HYPERPARATHYROIDISM: ICD-10-CM

## 2019-04-23 DIAGNOSIS — G89.29 CHRONIC LEFT SHOULDER PAIN: ICD-10-CM

## 2019-04-23 DIAGNOSIS — E78.2 COMBINED HYPERLIPIDEMIA ASSOCIATED WITH TYPE 2 DIABETES MELLITUS: ICD-10-CM

## 2019-04-23 DIAGNOSIS — Z12.12 SCREENING FOR COLORECTAL CANCER: ICD-10-CM

## 2019-04-23 DIAGNOSIS — E66.9 OBESITY, DIABETES, AND HYPERTENSION SYNDROME: ICD-10-CM

## 2019-04-23 DIAGNOSIS — E78.6 HIGH-DENSITY LIPOPROTEIN DEFICIENCY: ICD-10-CM

## 2019-04-23 PROCEDURE — 99215 PR OFFICE/OUTPT VISIT, EST, LEVL V, 40-54 MIN: ICD-10-PCS | Mod: S$PBB,,, | Performed by: INTERNAL MEDICINE

## 2019-04-23 PROCEDURE — 99999 PR PBB SHADOW E&M-EST. PATIENT-LVL I: CPT | Mod: PBBFAC,,, | Performed by: INTERNAL MEDICINE

## 2019-04-23 PROCEDURE — 99211 OFF/OP EST MAY X REQ PHY/QHP: CPT | Mod: PBBFAC,PO | Performed by: INTERNAL MEDICINE

## 2019-04-23 PROCEDURE — 99999 PR PBB SHADOW E&M-EST. PATIENT-LVL I: ICD-10-PCS | Mod: PBBFAC,,, | Performed by: INTERNAL MEDICINE

## 2019-04-23 PROCEDURE — 99215 OFFICE O/P EST HI 40 MIN: CPT | Mod: S$PBB,,, | Performed by: INTERNAL MEDICINE

## 2019-04-23 RX ORDER — TEMAZEPAM 15 MG/1
15 CAPSULE ORAL NIGHTLY PRN
Qty: 30 CAPSULE | Refills: 5 | Status: SHIPPED | OUTPATIENT
Start: 2019-04-23 | End: 2019-04-23 | Stop reason: SDUPTHER

## 2019-04-23 NOTE — PROGRESS NOTES
Chief complaint: Physical, last seen 1/18    69-year-old white female last seen 1/18.  She presents today for her physical although she has Medicare which does not formally cover physical her health assessment will be done either way.  She is followed by local Endocrine and she had labs about a month ago.  She will bring them and drop them off but for got today.  Her A1c did improve down to 6.5 her so but then she said it was back up into the 7 range.  She is on Januvia 50 mg now instead of the previous medicine.  Her PTH level was better.  Her vitamin-D was better.  Her local cardiologist said the persistent slight hypercalcemia may well be due to the hydrochlorothiazide.    She did have a left knee replacement in September 2018 is now walking without a cane since November has trying to walk 1 mi per day    She also developed some pain in the right ear dizziness and reduced hearing and is seeing an ENT at [facility].  She is set to get an MRI as she does have significantly reduced hearing on the right and may have an acoustic neuroma.    Another issue is insomnia.  She can't fall asleep.  She is using her CPAP.  She is on getting an hour have asleep when she does fall asleep.  She did try Restoril from her  and it worked well.  She does not need to take it every night only when she really can't get any sleep for a couple of nights.  She did make a call and found out it was the 30 mg pill.  We discussed trying the 15 mg pill of we discussed coverage issues and so forth which could be problematic    She does have some chronic left shoulder pain was told by Dr. collazo that she does have some bone spurs.  Today her pain appears to be a lot of left trapezius muscle pain likely compensation for some mild impingement which appears to be more so the acromioclavicular joint on the left.  We discussed use of Tylenol, application of heat to the trapezius muscle.  She does sleep on that side which is aggravating the  shoulder pain obviously it may persist until she makes adjustments.    Patient occasionally takes a tramadol.  She does not need a refill she has some left over.  She takes very intermittently.  Patient counseled at length regarding all these issues.  She was counseled regarding her hyperparathyroidism.  I do not have the details but did point out to her that she does have some ongoing mild vitamin D deficiency which could be causing her hyperparathyroidism is wellTotal time over 45 minutes with over 50% counseling.        ROS:   CONST: weight stable. EYES: no vision change. ENT: no sore throat. CV: no chest pain w/ exertion. RESP: no shortness of breath. GI: no nausea, vomiting, diarrhea. No dysphagia. : no urinary issues. MUSCULOSKELETAL: no other new myalgias or arthralgias. SKIN: no new changes. NEURO: no focal deficits. PSYCH: no new issues. ENDOCRINE: no polyuria. HEME: no lymph nodes. ALLERGY: no general pruritis.    PAST MEDICAL HISTORY:  1. Hypertension  2. Diabetes Mellitus Type II, follows with Dr. Martínez - Endocrinology  3. History of Post Streptococcal Glomerulonephritis  4. Chronic LE Edema  5. Sleep Apnea: 1st sleep study at [facility], 2nd at [facility], 3rd at [facility]  6. Obesity  7. Hyperlipidemia  8. Postmenopausal  9. Colonoscopy 12/2003, Dr. Stewart - [facility], 3 polyps 2/19 --3 yrs  10.  MNGoiter  11. Renal insufficiency  12. Hyperparathyroidism/hypercalcemia  13. Vit D def  14. Low HDL  15. STROKE with speech deficit 2013 at .  Now on Plavix.   16.  Right knee replacement 2016, left knee replacement 2018  17.  Urinary incontinence    PAST SURGICAL HISTORY:   1. Hysterectomy 1992 at [facility] secondary to fibroids  2. Partial Thyroidectomy 4/2005: 80% of Left and All of Right removed  3. Sleep Apnea surgery  4. Appendectomy  5. Tonsillectomy    SOCIAL HISTORY: No tobacco.   2015. Works at [employer].    FAMILY HISTORY:   1. Mom   from Breast Cancer  2. Dad  from Stomach Cancer secondary to Asbestos    Gen: no distress  EYES: conjunctiva clear, non-icteric, PERRL  ENT: nose clear, nasal mucosa normal, oropharynx clear and moist, teeth good  NECK:supple, thyroid non-palpable  RESP: effort is good, lungs clear  CV: heart RRR w/o murmur, gallops or rubs; no carotid bruits, no edema  GI: abdomen soft, non-distended, non-tender, no hepatosplenomegaly  MS: gait normal, no clubbing or cyanosis of the digits, left acromioclavicular joint pain and some left trapezius pain as above  SKIN: no rashes, warm to touch    Diagnoses and all orders for this visit:    Uncontrolled type 2 diabetes mellitus with stage 3 chronic kidney disease, with long-term current use of insulin, followed by Endocrine    Hyperparathyroidism, apparently responding to vitamin-D supplementation    Vitamin D deficiency disease    Hypertension associated with diabetes, sees Cardiology    Combined hyperlipidemia associated with type 2 diabetes mellitus, follows with Cardiology    Sleep apnea, unspecified type, continues on CPAP    Thyroid nodule    Screening for colorectal cancer, she did have her colonoscopy updated was found to have some polyps    Encounter for screening mammogram for breast cancer    Uncontrolled type 2 diabetes mellitus with hyperglycemia    High-density lipoprotein deficiency    Obesity, diabetes, and hypertension syndrome    Diabetes mellitus with renal manifestations, uncontrolled    History of stroke    Insomnia, unspecified type, new issue, intermittent use of the Restoril appears to be well tolerated and appropriate but will try her    Hypercalcemia    Chronic left shoulder pain, as above    Other orders  -     temazepam (RESTORIL) 15 mg Cap; Take 1 capsule (15 mg total) by mouth nightly as needed.  -     SITagliptin (JANUVIA) 100 MG Tab; Half a day

## 2019-04-23 NOTE — TELEPHONE ENCOUNTER
Patient pharmacy [facility] does not have the prescription for Temazepam in stock. Patient would like a new prescription sent [facility] [address] in [place]. thanks

## 2019-04-23 NOTE — TELEPHONE ENCOUNTER
----- Message from Ricardo García sent at 4/23/2019  3:50 PM CDT -----  Contact: pt  [phone]  .Type: RX Refill Request    Who Called: pt    Refill or New Rx :refill     RX Name and Strength::temazepam (RESTORIL) 15 mg Cap    Preferred Pharmacy with phone number:Pt wants [facility] that has current script availible    Local or Mail Order:local    Would the patient rather a call back or a response via [facility]? Call back    Best Call Back Number:[phone]    Additional Information: [facility] does not have the current medication and states that it has been on back order for a while. Pt would like Dr. Saenz to send prescription [facility] in [place] on [address].

## 2019-04-25 RX ORDER — TEMAZEPAM 15 MG/1
15 CAPSULE ORAL NIGHTLY PRN
Qty: 30 CAPSULE | Refills: 5 | Status: SHIPPED | OUTPATIENT
Start: 2019-04-25 | End: 2019-05-25

## 2019-05-29 ENCOUNTER — OFFICE VISIT (OUTPATIENT)
Dept: FAMILY MEDICINE | Facility: CLINIC | Age: 69
End: 2019-05-29
Payer: MEDICARE

## 2019-05-29 ENCOUNTER — TELEPHONE (OUTPATIENT)
Dept: FAMILY MEDICINE | Facility: CLINIC | Age: 69
End: 2019-05-29

## 2019-05-29 VITALS
SYSTOLIC BLOOD PRESSURE: 118 MMHG | TEMPERATURE: 98 F | OXYGEN SATURATION: 97 % | HEART RATE: 96 BPM | HEIGHT: 70 IN | DIASTOLIC BLOOD PRESSURE: 85 MMHG | WEIGHT: 223.88 LBS | BODY MASS INDEX: 32.05 KG/M2

## 2019-05-29 DIAGNOSIS — E11.59 OBESITY, DIABETES, AND HYPERTENSION SYNDROME: ICD-10-CM

## 2019-05-29 DIAGNOSIS — G47.33 OBSTRUCTIVE SLEEP APNEA TREATED WITH CONTINUOUS POSITIVE AIRWAY PRESSURE (CPAP): ICD-10-CM

## 2019-05-29 DIAGNOSIS — E11.65 UNCONTROLLED TYPE 2 DIABETES MELLITUS WITH HYPERGLYCEMIA: ICD-10-CM

## 2019-05-29 DIAGNOSIS — I15.2 OBESITY, DIABETES, AND HYPERTENSION SYNDROME: ICD-10-CM

## 2019-05-29 DIAGNOSIS — E11.69 OBESITY, DIABETES, AND HYPERTENSION SYNDROME: ICD-10-CM

## 2019-05-29 DIAGNOSIS — E11.59 HYPERTENSION ASSOCIATED WITH DIABETES: ICD-10-CM

## 2019-05-29 DIAGNOSIS — E21.3 HYPERPARATHYROIDISM: ICD-10-CM

## 2019-05-29 DIAGNOSIS — I15.2 HYPERTENSION ASSOCIATED WITH DIABETES: ICD-10-CM

## 2019-05-29 DIAGNOSIS — J01.90 ACUTE RHINOSINUSITIS: Primary | ICD-10-CM

## 2019-05-29 DIAGNOSIS — G47.33 OBSTRUCTIVE SLEEP APNEA ON CPAP: Primary | ICD-10-CM

## 2019-05-29 DIAGNOSIS — E66.9 OBESITY, DIABETES, AND HYPERTENSION SYNDROME: ICD-10-CM

## 2019-05-29 PROCEDURE — 99214 PR OFFICE/OUTPT VISIT, EST, LEVL IV, 30-39 MIN: ICD-10-PCS | Mod: S$PBB,,, | Performed by: NURSE PRACTITIONER

## 2019-05-29 PROCEDURE — 99999 PR PBB SHADOW E&M-EST. PATIENT-LVL V: CPT | Mod: PBBFAC,,, | Performed by: NURSE PRACTITIONER

## 2019-05-29 PROCEDURE — 99215 OFFICE O/P EST HI 40 MIN: CPT | Mod: PBBFAC,PO | Performed by: NURSE PRACTITIONER

## 2019-05-29 PROCEDURE — 99214 OFFICE O/P EST MOD 30 MIN: CPT | Mod: S$PBB,,, | Performed by: NURSE PRACTITIONER

## 2019-05-29 PROCEDURE — 99999 PR PBB SHADOW E&M-EST. PATIENT-LVL V: ICD-10-PCS | Mod: PBBFAC,,, | Performed by: NURSE PRACTITIONER

## 2019-05-29 RX ORDER — FLUTICASONE PROPIONATE 50 MCG
1 SPRAY, SUSPENSION (ML) NASAL DAILY
Qty: 1 BOTTLE | Refills: 3 | Status: SHIPPED | OUTPATIENT
Start: 2019-05-29 | End: 2019-10-10 | Stop reason: SDUPTHER

## 2019-05-29 RX ORDER — BENZONATATE 200 MG/1
200 CAPSULE ORAL 3 TIMES DAILY PRN
Qty: 30 CAPSULE | Refills: 0 | Status: SHIPPED | OUTPATIENT
Start: 2019-05-29 | End: 2019-06-08

## 2019-05-29 RX ORDER — IPRATROPIUM BROMIDE 21 UG/1
2 SPRAY, METERED NASAL 3 TIMES DAILY
Qty: 30 ML | Refills: 0 | Status: SHIPPED | OUTPATIENT
Start: 2019-05-29 | End: 2019-06-21 | Stop reason: SDUPTHER

## 2019-05-29 NOTE — TELEPHONE ENCOUNTER
Spoke with Inna at  she stated she received the orders for the pt CPAP supplies but, it has been over 5 years since pt last sleep study she will need orders to have a new sleep study done also. Please fax orders to [phone]

## 2019-05-29 NOTE — PROGRESS NOTES
History of Present Illness   Andrade Ojeda is a 69 y.o. woman with medical history as listed below who presents today for evaluation of cough/cold symptoms x3 days. She reports nasal congestion, sore throat, post nasal drip, and a productive cough. She has had no fevers. She is taking Allegra and Tylenol with no relief.    Of note, she also wears CPAP nightly for MILLIE, currently at 11 cm H2O. Her last sleep/titration study was in 2001 at [facility]. She reports wearing her CPAP at least 4-5 hours nightly. She feels well rested and does not sleep throughout the day after wearing her CPAP. She is due for repeat titration study.     She has no additional complaints and is otherwise healthy on today's visit.    Past Medical History:   Diagnosis Date    Anticoagulant long-term use     Plavix    Arthritis     Diabetes mellitus type II     Diabetes mellitus with renal manifestations, uncontrolled 5/5/2015    Edema leg     Chronic    High-density lipoprotein deficiency 2/24/2014    History of acute post-streptococcal glomerulonephritis     Hyperlipidemia     Hyperparathyroidism 2/24/2014    Hypertension     Obesity     Sleep apnea     Stroke approx 2013    CVA or TIA--    Thyroid disease     Vitamin D deficiency disease 2/24/2014       Past Surgical History:   Procedure Laterality Date    APPENDECTOMY      ARTHROPLASTY-KNEE Right 2/23/2016    [facility] by [facility] at [facility]    HYSTERECTOMY      JOINT REPLACEMENT Right     knee    rectum surgery      to stop bleeding    Sleep apnea Surgery      THYROIDECTOMY, PARTIAL  4/2005    80% of the Left and All of the Right    TONSILLECTOMY         Social History     Socioeconomic History    Marital status:      Spouse name: Not on file    Number of children: Not on file    Years of education: Not on file    Highest education level: Not on file   Occupational History    Not on file   Social Needs    Financial resource strain: Not on file  "   Food insecurity:     Worry: Not on file     Inability: Not on file    Transportation needs:     Medical: Not on file     Non-medical: Not on file   Tobacco Use    Smoking status: Never Smoker    Smokeless tobacco: Never Used   Substance and Sexual Activity    Alcohol use: Yes     Comment: rarely    Drug use: No    Sexual activity: Not on file   Lifestyle    Physical activity:     Days per week: Not on file     Minutes per session: Not on file    Stress: Not on file   Relationships    Social connections:     Talks on phone: Not on file     Gets together: Not on file     Attends Mu-ism service: Not on file     Active member of club or organization: Not on file     Attends meetings of clubs or organizations: Not on file     Relationship status: Not on file   Other Topics Concern    Not on file   Social History Narrative    Not on file       Family History   Problem Relation Age of Onset    Cancer Mother         Breast    Cancer Father         Stomach       Review of Systems  Review of Systems   Constitutional: Negative for chills and fever.   HENT: Positive for congestion and sore throat. Negative for ear discharge, ear pain and sinus pain.    Eyes: Negative for discharge and redness.   Respiratory: Positive for cough. Negative for sputum production, shortness of breath and wheezing.    Cardiovascular: Negative for chest pain.   Gastrointestinal: Negative for nausea and vomiting.     A complete review of systems was otherwise negative.    Physical Exam  /85   Pulse 96   Temp 97.8 °F (36.6 °C) (Oral)   Ht 5' 10" (1.778 m)   Wt 101.5 kg (223 lb 14 oz)   SpO2 97%   BMI 32.12 kg/m²   General appearance: alert, appears stated age, cooperative and no distress  Eyes: negative findings: lids and lashes normal and conjunctivae and sclerae normal  Ears: normal TM's and external ear canals both ears  Nose: clear discharge, moderate congestion, turbinates red, swollen, no sinus tenderness  Throat: " lips, mucosa, and tongue normal; teeth and gums normal and moderate oropharyngeal erythema with clear post nasal drainage  Lungs: clear to auscultation bilaterally  Heart: regular rate and rhythm, S1, S2 normal, no murmur, click, rub or gallop  Lymph nodes: Cervical, supraclavicular, and axillary nodes normal.  Neurologic: Grossly normal    Assessment/Plan  Acute rhinosinusitis  Continue Allegra daily and resume the Flonase.  Atrovent for congestion and post nasal drip with Tessalon PRN cough.  Tylenol for fever as needed.  Rest and stay well hydrated.  Viral, antibiotics not indicated.  RTC PRN.  -     fluticasone propionate (FLONASE) 50 mcg/actuation nasal spray; 1 spray (50 mcg total) by Each Nare route once daily.  Dispense: 1 Bottle; Refill: 3  -     benzonatate (TESSALON) 200 MG capsule; Take 1 capsule (200 mg total) by mouth 3 (three) times daily as needed.  Dispense: 30 capsule; Refill: 0  -     ipratropium (ATROVENT) 0.03 % nasal spray; 2 sprays by Nasal route 3 (three) times daily. for 10 days  Dispense: 30 mL; Refill: 0    Uncontrolled type 2 diabetes mellitus with hyperglycemia  The current medical regimen is effective;  continue present plan and medications.  Followed by endocrinology.    Diabetes mellitus with renal manifestations, uncontrolled  The current medical regimen is effective;  continue present plan and medications.  Followed by endocrinology.    Hypertension associated with diabetes  The current medical regimen is effective;  continue present plan and medications.  At goal today.    Obesity, diabetes, and hypertension syndrome  The patient is asked to make an attempt to improve diet and exercise patterns to aid in medical management of this problem.    Hyperparathyroidism  Stable.  Reduction in PTH per most recent labs.    Obstructive sleep apnea treated with continuous positive airway pressure (CPAP)  She is due for titration study.  She is wearing her CPAP nightly at least 4-5 hours.  She  feels well rested with wearing.  -     CPAP titration (Must have diagnosis of MILLIE from previous sleep study.); Future    Patient has verbalized understanding and is in agreement with plan of care.    Follow up if symptoms worsen or fail to improve.

## 2019-05-29 NOTE — PATIENT INSTRUCTIONS

## 2019-05-29 NOTE — TELEPHONE ENCOUNTER
----- Message from Ladan Muñoz sent at 5/29/2019 10:53 AM CDT -----  Contact: dae with [facility] [phone] fax [phone]  Type: Patient Call Back    Who calleddae with [facility] [phone] fax [phone]    What is the request in detail:needs order for sleep study. Call dae    Can the clinic reply by [facility]?    Would the patient rather a call back or a response via [facility]? Call back    Best call back number:dae with [facility] [phone] fax [phone]    Additional Information:

## 2019-06-18 DIAGNOSIS — G47.33 OBSTRUCTIVE SLEEP APNEA ON CPAP: Primary | ICD-10-CM

## 2019-06-21 DIAGNOSIS — J01.90 ACUTE RHINOSINUSITIS: ICD-10-CM

## 2019-06-24 RX ORDER — IPRATROPIUM BROMIDE 21 UG/1
SPRAY, METERED NASAL
Qty: 30 ML | Refills: 0 | Status: SHIPPED | OUTPATIENT
Start: 2019-06-24 | End: 2019-07-22 | Stop reason: SDUPTHER

## 2019-07-22 DIAGNOSIS — J01.90 ACUTE RHINOSINUSITIS: ICD-10-CM

## 2019-07-22 RX ORDER — IPRATROPIUM BROMIDE 21 UG/1
SPRAY, METERED NASAL
Qty: 30 ML | Refills: 0 | Status: SHIPPED | OUTPATIENT
Start: 2019-07-22

## 2019-08-27 ENCOUNTER — OFFICE VISIT (OUTPATIENT)
Dept: FAMILY MEDICINE | Facility: CLINIC | Age: 69
End: 2019-08-27
Payer: MEDICARE

## 2019-08-27 VITALS
HEIGHT: 60 IN | SYSTOLIC BLOOD PRESSURE: 129 MMHG | WEIGHT: 225.06 LBS | BODY MASS INDEX: 44.19 KG/M2 | DIASTOLIC BLOOD PRESSURE: 87 MMHG | HEART RATE: 103 BPM | OXYGEN SATURATION: 95 % | TEMPERATURE: 99 F

## 2019-08-27 DIAGNOSIS — I15.2 HYPERTENSION ASSOCIATED WITH DIABETES: ICD-10-CM

## 2019-08-27 DIAGNOSIS — E11.69 COMBINED HYPERLIPIDEMIA ASSOCIATED WITH TYPE 2 DIABETES MELLITUS: ICD-10-CM

## 2019-08-27 DIAGNOSIS — E11.65 UNCONTROLLED TYPE 2 DIABETES MELLITUS WITH HYPERGLYCEMIA: ICD-10-CM

## 2019-08-27 DIAGNOSIS — E78.2 COMBINED HYPERLIPIDEMIA ASSOCIATED WITH TYPE 2 DIABETES MELLITUS: ICD-10-CM

## 2019-08-27 DIAGNOSIS — M54.6 ACUTE LEFT-SIDED THORACIC BACK PAIN: Primary | ICD-10-CM

## 2019-08-27 DIAGNOSIS — E21.3 HYPERPARATHYROIDISM: ICD-10-CM

## 2019-08-27 DIAGNOSIS — E11.59 HYPERTENSION ASSOCIATED WITH DIABETES: ICD-10-CM

## 2019-08-27 PROCEDURE — 99214 OFFICE O/P EST MOD 30 MIN: CPT | Mod: S$PBB,,, | Performed by: NURSE PRACTITIONER

## 2019-08-27 PROCEDURE — 99999 PR PBB SHADOW E&M-EST. PATIENT-LVL V: CPT | Mod: PBBFAC,,, | Performed by: NURSE PRACTITIONER

## 2019-08-27 PROCEDURE — 99214 PR OFFICE/OUTPT VISIT, EST, LEVL IV, 30-39 MIN: ICD-10-PCS | Mod: S$PBB,,, | Performed by: NURSE PRACTITIONER

## 2019-08-27 PROCEDURE — 99999 PR PBB SHADOW E&M-EST. PATIENT-LVL V: ICD-10-PCS | Mod: PBBFAC,,, | Performed by: NURSE PRACTITIONER

## 2019-08-27 PROCEDURE — 99215 OFFICE O/P EST HI 40 MIN: CPT | Mod: PBBFAC,PO | Performed by: NURSE PRACTITIONER

## 2019-08-27 RX ORDER — TEMAZEPAM 15 MG/1
CAPSULE ORAL
Refills: 2 | COMMUNITY
Start: 2019-07-27 | End: 2020-09-08 | Stop reason: SDUPTHER

## 2019-08-27 RX ORDER — NAPROXEN 500 MG/1
500 TABLET ORAL 2 TIMES DAILY WITH MEALS
Qty: 20 TABLET | Refills: 0 | Status: SHIPPED | OUTPATIENT
Start: 2019-08-27 | End: 2019-09-06

## 2019-08-27 NOTE — PROGRESS NOTES
History of Present Illness   Andrade Ojeda is a 69 y.o. woman with medical history as listed below who presents today for evaluation of left sided thoracic back pain. The pain has been present for about 1 week. The pain is intermittent and occurs with certain movements. The pain is a sharp shooting pain that does not radiate. She reports feeling a knot in the area associated with the pain. There is no numbness, tingling, or focal weakness. She denies rash or lesions to the area. She has no urinary complaints. She has tried massage, heat, and ice to the area with no relief. She denies known injury or trauma but has been exerting herself more than usual taking care of two sick friends. She has no additional complaints and is otherwise healthy on today's visit.    Past Medical History:   Diagnosis Date    Anticoagulant long-term use     Plavix    Arthritis     Diabetes mellitus type II     Diabetes mellitus with renal manifestations, uncontrolled 5/5/2015    Edema leg     Chronic    High-density lipoprotein deficiency 2/24/2014    History of acute post-streptococcal glomerulonephritis     Hyperlipidemia     Hyperparathyroidism 2/24/2014    Hypertension     Obesity     Sleep apnea     Stroke approx 2013    CVA or TIA--    Thyroid disease     Vitamin D deficiency disease 2/24/2014       Past Surgical History:   Procedure Laterality Date    APPENDECTOMY      ARTHROPLASTY-KNEE Right 2/23/2016    [facility]    HYSTERECTOMY      JOINT REPLACEMENT Right     knee    rectum surgery      to stop bleeding    Sleep apnea Surgery      THYROIDECTOMY, PARTIAL  4/2005    80% of the Left and All of the Right    TONSILLECTOMY         Social History     Socioeconomic History    Marital status:      Spouse name: Not on file    Number of children: Not on file    Years of education: Not on file    Highest education level: Not on file   Occupational History    Not on  file   Social Needs    Financial resource strain: Not on file    Food insecurity:     Worry: Not on file     Inability: Not on file    Transportation needs:     Medical: Not on file     Non-medical: Not on file   Tobacco Use    Smoking status: Never Smoker    Smokeless tobacco: Never Used   Substance and Sexual Activity    Alcohol use: Yes     Comment: rarely    Drug use: No    Sexual activity: Not on file   Lifestyle    Physical activity:     Days per week: Not on file     Minutes per session: Not on file    Stress: Not on file   Relationships    Social connections:     Talks on phone: Not on file     Gets together: Not on file     Attends Mandaen service: Not on file     Active member of club or organization: Not on file     Attends meetings of clubs or organizations: Not on file     Relationship status: Not on file   Other Topics Concern    Not on file   Social History Narrative    Not on file       Family History   Problem Relation Age of Onset    Cancer Mother         Breast    Cancer Father         Stomach       Review of Systems  Review of Systems   Constitutional: Negative for fever.   Gastrointestinal: Negative for abdominal pain.   Genitourinary: Negative for flank pain and hematuria.   Musculoskeletal: Positive for back pain. Negative for falls and joint pain.   Skin: Negative for rash.   Neurological: Negative for tingling, sensory change and focal weakness.     A complete review of systems was otherwise negative.    Physical Exam  /87   Pulse 103   Temp 98.7 °F (37.1 °C) (Oral)   Ht 5' (1.524 m)   Wt 102.1 kg (225 lb 1.4 oz)   SpO2 95%   BMI 43.96 kg/m²   General appearance: alert, appears stated age, cooperative and no distress  Neck: supple, symmetrical, trachea midline and FROM with no bony TTP  Back: symmetric, no curvature. ROM normal. No CVA tenderness., FROM with no bony TTP  Lungs: clear to auscultation bilaterally  Heart: regular rate and rhythm, S1, S2 normal, no  murmur, click, rub or gallop  Extremities: extremities normal, atraumatic, no cyanosis or edema  Pulses: 2+ and symmetric  Skin: Skin color, texture, turgor normal. No rashes or lesions  Neurologic: Grossly normal    Assessment/Plan  Acute left-sided thoracic back pain  Likely musculoskeletal injury, no red flags on exam.  Short course of NSAID BID. Be sure to increase hydration and avoid other nephrotoxic agents while taking.  Continue heat/ice/stretches/massage.  Rest and avoid strenuous activity or heavy lifting.  RTC PRN.  ER precautions discussed, no red flags on exam.  -     naproxen (NAPROSYN) 500 MG tablet; Take 1 tablet (500 mg total) by mouth 2 (two) times daily with meals. for 10 days  Dispense: 20 tablet; Refill: 0    Hypertension associated with diabetes  The current medical regimen is effective;  continue present plan and medications.    Combined hyperlipidemia associated with type 2 diabetes mellitus  The current medical regimen is effective;  continue present plan and medications.    Uncontrolled type 2 diabetes mellitus with hyperglycemia  The current medical regimen is effective;  continue present plan and medications.  Followed by endocrinology.    Hyperparathyroidism  The current medical regimen is effective;  continue present plan and medications.    Diabetes mellitus with renal manifestations, uncontrolled  The current medical regimen is effective;  continue present plan and medications.  As above.    She has declined HM today.    Patient has verbalized understanding and is in agreement with plan of care.    Follow up if symptoms worsen or fail to improve.

## 2019-08-29 ENCOUNTER — TELEPHONE (OUTPATIENT)
Dept: FAMILY MEDICINE | Facility: CLINIC | Age: 69
End: 2019-08-29

## 2019-08-29 NOTE — TELEPHONE ENCOUNTER
I believe the patient's sleep study was performed at [facility]. Can we confirm with the patient and if so request those records to be faxed to [facility]?    Thanks!  MCKENNA Singh

## 2019-08-29 NOTE — TELEPHONE ENCOUNTER
----- Message from Diamante Alexander sent at 8/29/2019  9:28 AM CDT -----  Regarding: Cpap  Contact: [phone]  I've received an order for a Cpap machine but will need copies of patient's sleep study before order can be processed. Thanks!

## 2019-08-29 NOTE — TELEPHONE ENCOUNTER
Spoke with pt states she is going to the sleep study place by [facility] and get her records and bring to [facility]

## 2019-09-12 ENCOUNTER — HOSPITAL ENCOUNTER (OUTPATIENT)
Dept: RADIOLOGY | Facility: HOSPITAL | Age: 69
Discharge: HOME | End: 2019-09-12
Attending: NURSE PRACTITIONER
Payer: MEDICARE

## 2019-09-12 ENCOUNTER — OFFICE VISIT (OUTPATIENT)
Dept: FAMILY MEDICINE | Facility: CLINIC | Age: 69
End: 2019-09-12
Payer: MEDICARE

## 2019-09-12 VITALS
WEIGHT: 223.69 LBS | HEIGHT: 60 IN | SYSTOLIC BLOOD PRESSURE: 128 MMHG | HEART RATE: 86 BPM | OXYGEN SATURATION: 96 % | DIASTOLIC BLOOD PRESSURE: 86 MMHG | BODY MASS INDEX: 43.91 KG/M2 | TEMPERATURE: 99 F

## 2019-09-12 DIAGNOSIS — M62.838 CERVICAL PARASPINAL MUSCLE SPASM: ICD-10-CM

## 2019-09-12 DIAGNOSIS — E11.59 OBESITY, DIABETES, AND HYPERTENSION SYNDROME: ICD-10-CM

## 2019-09-12 DIAGNOSIS — M25.512 ACUTE PAIN OF LEFT SHOULDER: ICD-10-CM

## 2019-09-12 DIAGNOSIS — E11.65 UNCONTROLLED TYPE 2 DIABETES MELLITUS WITH HYPERGLYCEMIA: ICD-10-CM

## 2019-09-12 DIAGNOSIS — E11.59 HYPERTENSION ASSOCIATED WITH DIABETES: ICD-10-CM

## 2019-09-12 DIAGNOSIS — E11.69 OBESITY, DIABETES, AND HYPERTENSION SYNDROME: ICD-10-CM

## 2019-09-12 DIAGNOSIS — E11.69 COMBINED HYPERLIPIDEMIA ASSOCIATED WITH TYPE 2 DIABETES MELLITUS: ICD-10-CM

## 2019-09-12 DIAGNOSIS — I15.2 HYPERTENSION ASSOCIATED WITH DIABETES: ICD-10-CM

## 2019-09-12 DIAGNOSIS — E78.6 HIGH-DENSITY LIPOPROTEIN DEFICIENCY: ICD-10-CM

## 2019-09-12 DIAGNOSIS — E66.9 OBESITY, DIABETES, AND HYPERTENSION SYNDROME: ICD-10-CM

## 2019-09-12 DIAGNOSIS — M25.512 ACUTE PAIN OF LEFT SHOULDER: Primary | ICD-10-CM

## 2019-09-12 DIAGNOSIS — M19.012 PRIMARY OSTEOARTHRITIS OF LEFT SHOULDER: ICD-10-CM

## 2019-09-12 DIAGNOSIS — E78.2 COMBINED HYPERLIPIDEMIA ASSOCIATED WITH TYPE 2 DIABETES MELLITUS: ICD-10-CM

## 2019-09-12 DIAGNOSIS — Z96.652 S/P TKR (TOTAL KNEE REPLACEMENT), LEFT: ICD-10-CM

## 2019-09-12 DIAGNOSIS — I15.2 OBESITY, DIABETES, AND HYPERTENSION SYNDROME: ICD-10-CM

## 2019-09-12 PROCEDURE — 73030 X-RAY EXAM OF SHOULDER: CPT | Mod: 26,LT,, | Performed by: RADIOLOGY

## 2019-09-12 PROCEDURE — 99999 PR PBB SHADOW E&M-EST. PATIENT-LVL V: ICD-10-PCS | Mod: PBBFAC,,, | Performed by: NURSE PRACTITIONER

## 2019-09-12 PROCEDURE — 99215 OFFICE O/P EST HI 40 MIN: CPT | Mod: PBBFAC,25,PO | Performed by: NURSE PRACTITIONER

## 2019-09-12 PROCEDURE — 73030 X-RAY EXAM OF SHOULDER: CPT | Mod: TC,FY,PO,LT

## 2019-09-12 PROCEDURE — 73030 XR SHOULDER COMPLETE 2 OR MORE VIEWS LEFT: ICD-10-PCS | Mod: 26,LT,, | Performed by: RADIOLOGY

## 2019-09-12 PROCEDURE — 99214 OFFICE O/P EST MOD 30 MIN: CPT | Mod: S$PBB,,, | Performed by: NURSE PRACTITIONER

## 2019-09-12 PROCEDURE — 99214 PR OFFICE/OUTPT VISIT, EST, LEVL IV, 30-39 MIN: ICD-10-PCS | Mod: S$PBB,,, | Performed by: NURSE PRACTITIONER

## 2019-09-12 PROCEDURE — 99999 PR PBB SHADOW E&M-EST. PATIENT-LVL V: CPT | Mod: PBBFAC,,, | Performed by: NURSE PRACTITIONER

## 2019-09-12 RX ORDER — TIZANIDINE 4 MG/1
4 TABLET ORAL NIGHTLY
Qty: 30 TABLET | Refills: 0 | Status: SHIPPED | OUTPATIENT
Start: 2019-09-12 | End: 2019-09-22

## 2019-09-12 RX ORDER — NAPROXEN 500 MG/1
500 TABLET ORAL 2 TIMES DAILY WITH MEALS
Qty: 30 TABLET | Refills: 0 | Status: SHIPPED | OUTPATIENT
Start: 2019-09-12 | End: 2019-09-27
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
## 2019-09-13 ENCOUNTER — TELEPHONE (OUTPATIENT)
Dept: FAMILY MEDICINE | Facility: CLINIC | Age: 69
End: 2019-09-13

## 2019-09-23 RX ORDER — INSULIN DETEMIR 100 [IU]/ML
INJECTION, SOLUTION SUBCUTANEOUS
Qty: 90 ML | Refills: 3 | Status: SHIPPED | OUTPATIENT
Start: 2019-09-23 | End: 2020-09-08

## 2019-10-10 DIAGNOSIS — J01.90 ACUTE RHINOSINUSITIS: ICD-10-CM

## 2019-10-10 RX ORDER — FLUTICASONE PROPIONATE 50 MCG
SPRAY, SUSPENSION (ML) NASAL
Qty: 16 ML | Refills: 0 | Status: SHIPPED | OUTPATIENT
Start: 2019-10-10 | End: 2019-11-10 | Stop reason: SDUPTHER

## 2019-11-04 ENCOUNTER — TELEPHONE (OUTPATIENT)
Dept: FAMILY MEDICINE | Facility: CLINIC | Age: 69
End: 2019-11-04

## 2019-11-04 NOTE — TELEPHONE ENCOUNTER
----- Message from Margot Rivas sent at 11/4/2019  2:00 PM CST -----  Contact: Andrade [phone]  Type:  Pre-Op Appt    Patient is requesting a pre op appt.      Name of Caller: Andrade    Preferred Appt Date and Time: any date or time, except Thursday October 8    When is the surgery date? November 19    Type of Surgery eye surgery     Would the patient rather a call back or a response via My [facility]? Call back     Best Call Back Number: [phone]

## 2019-11-08 ENCOUNTER — OFFICE VISIT (OUTPATIENT)
Dept: FAMILY MEDICINE | Facility: CLINIC | Age: 69
End: 2019-11-08
Payer: MEDICARE

## 2019-11-08 VITALS
HEART RATE: 76 BPM | SYSTOLIC BLOOD PRESSURE: 125 MMHG | DIASTOLIC BLOOD PRESSURE: 87 MMHG | OXYGEN SATURATION: 95 % | BODY MASS INDEX: 45.38 KG/M2 | HEIGHT: 60 IN | WEIGHT: 231.13 LBS | TEMPERATURE: 98 F

## 2019-11-08 DIAGNOSIS — I15.2 OBESITY, DIABETES, AND HYPERTENSION SYNDROME: ICD-10-CM

## 2019-11-08 DIAGNOSIS — E11.65 UNCONTROLLED TYPE 2 DIABETES MELLITUS WITH HYPERGLYCEMIA: ICD-10-CM

## 2019-11-08 DIAGNOSIS — E78.2 COMBINED HYPERLIPIDEMIA ASSOCIATED WITH TYPE 2 DIABETES MELLITUS: ICD-10-CM

## 2019-11-08 DIAGNOSIS — E78.6 HIGH-DENSITY LIPOPROTEIN DEFICIENCY: ICD-10-CM

## 2019-11-08 DIAGNOSIS — E11.69 COMBINED HYPERLIPIDEMIA ASSOCIATED WITH TYPE 2 DIABETES MELLITUS: ICD-10-CM

## 2019-11-08 DIAGNOSIS — I15.2 HYPERTENSION ASSOCIATED WITH DIABETES: ICD-10-CM

## 2019-11-08 DIAGNOSIS — G47.33 OBSTRUCTIVE SLEEP APNEA TREATED WITH CONTINUOUS POSITIVE AIRWAY PRESSURE (CPAP): ICD-10-CM

## 2019-11-08 DIAGNOSIS — Z01.818 PRE-OP EXAMINATION: Primary | ICD-10-CM

## 2019-11-08 DIAGNOSIS — E11.59 OBESITY, DIABETES, AND HYPERTENSION SYNDROME: ICD-10-CM

## 2019-11-08 DIAGNOSIS — E11.59 HYPERTENSION ASSOCIATED WITH DIABETES: ICD-10-CM

## 2019-11-08 DIAGNOSIS — E21.3 HYPERPARATHYROIDISM: ICD-10-CM

## 2019-11-08 DIAGNOSIS — N28.9 RENAL INSUFFICIENCY: ICD-10-CM

## 2019-11-08 DIAGNOSIS — E66.9 OBESITY, DIABETES, AND HYPERTENSION SYNDROME: ICD-10-CM

## 2019-11-08 DIAGNOSIS — E55.9 VITAMIN D DEFICIENCY DISEASE: ICD-10-CM

## 2019-11-08 DIAGNOSIS — E11.69 OBESITY, DIABETES, AND HYPERTENSION SYNDROME: ICD-10-CM

## 2019-11-08 DIAGNOSIS — E11.9 TYPE 2 DIABETES MELLITUS WITHOUT COMPLICATION: ICD-10-CM

## 2019-11-08 PROCEDURE — 99999 PR PBB SHADOW E&M-EST. PATIENT-LVL IV: CPT | Mod: PBBFAC,,, | Performed by: NURSE PRACTITIONER

## 2019-11-08 PROCEDURE — 99214 PR OFFICE/OUTPT VISIT, EST, LEVL IV, 30-39 MIN: ICD-10-PCS | Mod: S$PBB,,, | Performed by: NURSE PRACTITIONER

## 2019-11-08 PROCEDURE — 99999 PR PBB SHADOW E&M-EST. PATIENT-LVL IV: ICD-10-PCS | Mod: PBBFAC,,, | Performed by: NURSE PRACTITIONER

## 2019-11-08 PROCEDURE — 93010 EKG 12-LEAD: ICD-10-PCS | Mod: S$PBB,,, | Performed by: INTERNAL MEDICINE

## 2019-11-08 PROCEDURE — 93005 ELECTROCARDIOGRAM TRACING: CPT | Mod: PBBFAC,PO | Performed by: INTERNAL MEDICINE

## 2019-11-08 PROCEDURE — 93010 ELECTROCARDIOGRAM REPORT: CPT | Mod: S$PBB,,, | Performed by: INTERNAL MEDICINE

## 2019-11-08 PROCEDURE — 99214 OFFICE O/P EST MOD 30 MIN: CPT | Mod: S$PBB,,, | Performed by: NURSE PRACTITIONER

## 2019-11-08 PROCEDURE — 99214 OFFICE O/P EST MOD 30 MIN: CPT | Mod: PBBFAC,PO | Performed by: NURSE PRACTITIONER

## 2019-11-08 NOTE — PROGRESS NOTES
Subjective:      Andrade Ojeda is a 69 y.o. female who presents to the office today for a preoperative consultation at the request of surgeon outpatient eye surgery center who plans on performing cataract extraction with lens implant, right on November 19. This consultation is requested for the specific conditions prompting preoperative evaluation (i.e. because of potential affect on operative risk): hypertension, hyperlipidemia, T2DM, renal insufficiency, MILLIE on CPAP. Planned anesthesia: monitored, per anesthesia team. The patient has the following known anesthesia issues: no known complications with anesthesia or intubation. Patients bleeding risk: no recent abnormal bleeding. Patient does not have objections to receiving blood products if needed.    NSAID use: None daily.  Corticosteroid use: None.  Recent illness/fever/antibiotic use: None.  Anticoagulation: Daily Plavix.    The following portions of the patient's history were reviewed and updated as appropriate: allergies, current medications, past medical history, past surgical history and problem list.    Review of Systems  Constitutional: negative for fatigue and fevers  Respiratory: negative for dyspnea on exertion and wheezing  Cardiovascular: negative for chest pain, chest pressure/discomfort, dyspnea and palpitations  Hematologic/lymphatic: negative for bleeding, easy bruising and lymphadenopathy     A comprehensive ROS was otherwise unremarkable.     Objective:      /87   Pulse 76   Temp 97.9 °F (36.6 °C) (Oral)   Ht 5' (1.524 m)   Wt 104.9 kg (231 lb 2.4 oz)   SpO2 95%   BMI 45.14 kg/m²   General appearance: alert, appears stated age, cooperative and no distress  Neck: no carotid bruit, no JVD and supple, symmetrical, trachea midline  Lungs: clear to auscultation bilaterally  Heart: irregular rate and rhythm, S1, S2 normal, systolic murmur present, no click, rub or gallop  Abdomen: soft, non-tender; bowel sounds normal; no masses,   no organomegaly  Extremities: extremities normal, atraumatic, no cyanosis, 2+ non-pitting edema BLE  Pulses: 2+ and symmetric  Skin: Skin color, texture, turgor normal. No rashes or lesions  Lymph nodes: Cervical, supraclavicular, and axillary nodes normal.  Neurologic: Grossly normal    Cardiographics  ECG: atrial fibrillation with PVC's       Assessment:        69 y.o. female with planned surgery as above.    Known risk factors for perioperative complications: Chronic pulmonary disease  Diabetes mellitus  Renal dysfunction      Cardiac Risk Estimation: Moderate risk.    Current medications which may produce withdrawal symptoms if withheld perioperatively: None       Plan:     Pre-op examination  New onset atrial fibrillation per our records.   I am unsure if this has been documented per cardiologist at [facility].  She is clinically stable, rate controlled, and asymptomatic.  She will require clearance per cardiology prior to upcoming procedure.  -     IN OFFICE EKG 12-LEAD (to Muse)    Uncontrolled type 2 diabetes mellitus with hyperglycemia  The current medical regimen is effective;  continue present plan and medications.    Diabetes mellitus with renal manifestations, uncontrolled  The current medical regimen is effective;  continue present plan and medications.    Combined hyperlipidemia associated with type 2 diabetes mellitus  The current medical regimen is effective;  continue present plan and medications.    Renal insufficiency  The current medical regimen is effective;  continue present plan and medications.    Hypertension associated with diabetes  The current medical regimen is effective;  continue present plan and medications.    Obstructive sleep apnea treated with continuous positive airway pressure (CPAP)  The current medical regimen is effective;  continue present plan and medications.    Obesity, diabetes, and hypertension syndrome  The current medical regimen is effective;  continue present plan and  medications.    High-density lipoprotein deficiency  The current medical regimen is effective;  continue present plan and medications.    Hyperparathyroidism  The current medical regimen is effective;  continue present plan and medications.    Vitamin D deficiency disease  The current medical regimen is effective;  continue present plan and medications.    Patient is not cleared for surgery- she will require cardiology clearance given new onset atrial fibrillation today.    Patient has verbalized understanding and is in agreement with plan of care.

## 2019-11-10 DIAGNOSIS — J01.90 ACUTE RHINOSINUSITIS: ICD-10-CM

## 2019-11-12 RX ORDER — FLUTICASONE PROPIONATE 50 MCG
SPRAY, SUSPENSION (ML) NASAL
Qty: 16 ML | Refills: 0 | Status: SHIPPED | OUTPATIENT
Start: 2019-11-12

## 2019-11-17 ENCOUNTER — PATIENT OUTREACH (OUTPATIENT)
Dept: ADMINISTRATIVE | Facility: HOSPITAL | Age: 69
End: 2019-11-17

## 2020-01-04 LAB
HBA1C MFR BLD: 7.5 %
HDLC SERPL-MCNC: 38 MG/DL
LDLC SERPL CALC-MCNC: 84 MG/DL
LIPIDS, TOTAL CHOLESTEROL: 148
TRIGLYCERIDE (LIPID PAN): 157

## 2020-01-08 LAB
LEFT EYE DM RETINOPATHY: NEGATIVE
RIGHT EYE DM RETINOPATHY: NEGATIVE

## 2020-02-19 RX ORDER — HYDROCHLOROTHIAZIDE 25 MG/1
TABLET ORAL
Qty: 30 TABLET | Refills: 1 | Status: SHIPPED | OUTPATIENT
Start: 2020-02-19 | End: 2020-04-15

## 2020-03-30 RX ORDER — TELMISARTAN 80 MG/1
TABLET ORAL
Qty: 30 TABLET | Refills: 6 | Status: SHIPPED | OUTPATIENT
Start: 2020-03-30 | End: 2023-04-27

## 2020-04-08 ENCOUNTER — TELEPHONE (OUTPATIENT)
Dept: FAMILY MEDICINE | Facility: CLINIC | Age: 70
End: 2020-04-08

## 2020-04-08 DIAGNOSIS — G47.33 OSA (OBSTRUCTIVE SLEEP APNEA): Primary | ICD-10-CM

## 2020-04-08 NOTE — TELEPHONE ENCOUNTER
----- Message from Vivi Chambers sent at 4/8/2020  3:34 PM CDT -----  Contact: AMISH GAVIRIA [[id number]]  Who called:AMISH GAVIRIA [[id number]]    What is the request in detail: Patient is requesting a call back. She states she called [facility] to get CPAP machine supplies. She was advised that after the time she picked up the machine, she had to have seen a provider within three months and have paperwork filled out before she can get more supplies. She states she uses the machine daily. She would prefer to not have to come into the office, but she would like to further discuss.   Please advise.    Can the clinic reply by [facility]? No    Best call back number: [phone]    Additional Information: N/A

## 2020-04-08 NOTE — TELEPHONE ENCOUNTER
I will sign from home so you will need to reprint the signed order and send to [facility] or ask [facility] to send us what they need to sign

## 2020-04-15 RX ORDER — HYDROCHLOROTHIAZIDE 25 MG/1
TABLET ORAL
Qty: 30 TABLET | Refills: 1 | Status: SHIPPED | OUTPATIENT
Start: 2020-04-15 | End: 2023-04-27

## 2020-05-22 ENCOUNTER — PATIENT OUTREACH (OUTPATIENT)
Dept: ADMINISTRATIVE | Facility: HOSPITAL | Age: 70
End: 2020-05-22

## 2020-06-01 ENCOUNTER — PATIENT OUTREACH (OUTPATIENT)
Dept: ADMINISTRATIVE | Facility: HOSPITAL | Age: 70
End: 2020-06-01

## 2020-06-16 LAB
CREATININE RANDOM URINE: 101 MG/DL (ref 20–275)
MICROALBUMIN URINE RANDOM: 1
MICROALBUMIN/CREATININE RATIO: 10 UG/MG

## 2020-06-17 LAB
ALBUMIN/GLOBULIN RATIO: 1.5
ALBUMIN: 4
ALP ISOS SERPL LEV INH-CCNC: 47 U/L
ALT: 17
AST: 16
BILIRUBIN TOTAL: 0.7
BUN/CREAT SERPL: 29
CALCIUM SERPL-MCNC: 11.1 MG/DL
CHLORIDE: 101
CO2 SERPL-SCNC: 28 MMOL/L (ref 21–32)
CREAT SERPL-MCNC: 1.1 MG/DL
EST. GFR  (NON AFRICAN AMERICAN): 51 MG/DL
GLOBULIN: 2.7
GLUCOSE: 139
HBA1C MFR BLD: 7.9 %
POTASSIUM: 3.5
PROTEIN TOTAL: 6.7
SODIUM: 141
UREA NITROGEN (BUN): 32

## 2020-06-23 ENCOUNTER — TELEPHONE (OUTPATIENT)
Dept: FAMILY MEDICINE | Facility: CLINIC | Age: 70
End: 2020-06-23

## 2020-06-23 DIAGNOSIS — E55.9 VITAMIN D DEFICIENCY DISEASE: ICD-10-CM

## 2020-06-23 DIAGNOSIS — E11.59 HYPERTENSION ASSOCIATED WITH DIABETES: ICD-10-CM

## 2020-06-23 DIAGNOSIS — I15.2 HYPERTENSION ASSOCIATED WITH DIABETES: ICD-10-CM

## 2020-06-23 DIAGNOSIS — E11.65 UNCONTROLLED TYPE 2 DIABETES MELLITUS WITH HYPERGLYCEMIA: Primary | ICD-10-CM

## 2020-06-23 NOTE — TELEPHONE ENCOUNTER
----- Message from Kaleigh Ledesma sent at 6/23/2020  4:10 PM CDT -----  Contact: Patient [phone]  Type: Lab    Caller is requesting to schedule their Lab appointment prior to annual appointment.    Order is not listed in EPIC.  Please enter order and contact patient to schedule.    Name of Caller: Patient    Preferred Date and Time of Labs: 08-14-20    Date of EPP Appointment:08-14-20    Where would they like the lab performed? [place]    Would the patient rather a call back or a response via My [facility]? Call back    Best Call Back Number: [phone]    Additional Information: Will bring in labs from Endocrinologist so Dr Saenz can go over them. If more labs are needed Dr Saenz will order.

## 2020-06-24 ENCOUNTER — TELEPHONE (OUTPATIENT)
Dept: FAMILY MEDICINE | Facility: CLINIC | Age: 70
End: 2020-06-24

## 2020-06-24 NOTE — TELEPHONE ENCOUNTER
Patient states she has done labs already with her endocrinologist and will bring results to appointment, does not want to schedule another lab appointment.

## 2020-06-24 NOTE — TELEPHONE ENCOUNTER
----- Message from Bell Wilson sent at 6/24/2020  9:56 AM CDT -----  Regarding: lab test   Type:  Patient Returning Call    Who Called: AMISH GAVIRIA [[id number]]    Who Left Message for Patient:kathleen    Does the patient know what this is regarding?yes    Best Call Back Number:[phone]    Additional Information:

## 2020-07-15 DIAGNOSIS — Z71.89 COMPLEX CARE COORDINATION: ICD-10-CM

## 2020-08-19 ENCOUNTER — LAB VISIT (OUTPATIENT)
Dept: LAB | Facility: HOSPITAL | Age: 70
End: 2020-08-19
Attending: FAMILY MEDICINE
Payer: MEDICARE

## 2020-08-19 ENCOUNTER — OFFICE VISIT (OUTPATIENT)
Dept: FAMILY MEDICINE | Facility: CLINIC | Age: 70
End: 2020-08-19
Payer: MEDICARE

## 2020-08-19 VITALS
HEIGHT: 60 IN | OXYGEN SATURATION: 97 % | WEIGHT: 233.94 LBS | BODY MASS INDEX: 45.93 KG/M2 | SYSTOLIC BLOOD PRESSURE: 130 MMHG | HEART RATE: 88 BPM | DIASTOLIC BLOOD PRESSURE: 70 MMHG | TEMPERATURE: 98 F

## 2020-08-19 DIAGNOSIS — N30.00 ACUTE CYSTITIS WITHOUT HEMATURIA: ICD-10-CM

## 2020-08-19 DIAGNOSIS — N30.00 ACUTE CYSTITIS WITHOUT HEMATURIA: Primary | ICD-10-CM

## 2020-08-19 DIAGNOSIS — E11.59 HYPERTENSION ASSOCIATED WITH DIABETES: ICD-10-CM

## 2020-08-19 DIAGNOSIS — E11.69 COMBINED HYPERLIPIDEMIA ASSOCIATED WITH TYPE 2 DIABETES MELLITUS: ICD-10-CM

## 2020-08-19 DIAGNOSIS — E11.65 UNCONTROLLED TYPE 2 DIABETES MELLITUS WITH HYPERGLYCEMIA: ICD-10-CM

## 2020-08-19 DIAGNOSIS — I15.2 HYPERTENSION ASSOCIATED WITH DIABETES: ICD-10-CM

## 2020-08-19 DIAGNOSIS — E78.2 COMBINED HYPERLIPIDEMIA ASSOCIATED WITH TYPE 2 DIABETES MELLITUS: ICD-10-CM

## 2020-08-19 LAB
BACTERIA #/AREA URNS AUTO: ABNORMAL /HPF
MICROSCOPIC COMMENT: ABNORMAL
RBC #/AREA URNS AUTO: 63 /HPF (ref 0–4)
WBC #/AREA URNS AUTO: >100 /HPF (ref 0–5)
WBC CLUMPS UR QL AUTO: ABNORMAL

## 2020-08-19 PROCEDURE — 87186 SC STD MICRODIL/AGAR DIL: CPT

## 2020-08-19 PROCEDURE — 99214 OFFICE O/P EST MOD 30 MIN: CPT | Mod: PBBFAC,PO | Performed by: FAMILY MEDICINE

## 2020-08-19 PROCEDURE — 87077 CULTURE AEROBIC IDENTIFY: CPT

## 2020-08-19 PROCEDURE — 87086 URINE CULTURE/COLONY COUNT: CPT

## 2020-08-19 PROCEDURE — 87088 URINE BACTERIA CULTURE: CPT

## 2020-08-19 PROCEDURE — 99999 PR PBB SHADOW E&M-EST. PATIENT-LVL IV: ICD-10-PCS | Mod: PBBFAC,,, | Performed by: FAMILY MEDICINE

## 2020-08-19 PROCEDURE — 99999 PR PBB SHADOW E&M-EST. PATIENT-LVL IV: CPT | Mod: PBBFAC,,, | Performed by: FAMILY MEDICINE

## 2020-08-19 PROCEDURE — 81001 URINALYSIS AUTO W/SCOPE: CPT

## 2020-08-19 PROCEDURE — 99214 OFFICE O/P EST MOD 30 MIN: CPT | Mod: S$PBB,,, | Performed by: FAMILY MEDICINE

## 2020-08-19 PROCEDURE — 99214 PR OFFICE/OUTPT VISIT, EST, LEVL IV, 30-39 MIN: ICD-10-PCS | Mod: S$PBB,,, | Performed by: FAMILY MEDICINE

## 2020-08-19 RX ORDER — DOXYCYCLINE HYCLATE 100 MG
100 TABLET ORAL EVERY 12 HOURS
Qty: 14 TABLET | Refills: 0 | Status: SHIPPED | OUTPATIENT
Start: 2020-08-19 | End: 2023-04-27

## 2020-08-19 NOTE — PROGRESS NOTES
Office Visit    Patient Name: Andrade Ojeda    : 1950  MRN: [id number]      Assessment/Plan:  Andrade Ojeda is a 70 y.o. female who presents today for :    Acute cystitis without hematuria  -     POCT urinalysis, dipstick or tablet reag  -     Urinalysis Microscopic; Future; Expected date: 2020  -     Urine culture; Future; Expected date: 2020  -     doxycycline (VIBRA-TABS) 100 MG tablet; Take 1 tablet (100 mg total) by mouth every 12 (twelve) hours.  Dispense: 14 tablet; Refill: 0  -afebrile/VSS - f/u UCx  -advised adequate hydration and proper hygiene  -avoid bladder irritants such as tea, coffee, caffeine, alcohol, artificial sweeteners, citrus, spicy foods, acidic foods,chocolate, tomato-based foods, smoking  -RTC if Sx worsens or call clinic back if pt has any concerns.      Uncontrolled type 2 diabetes mellitus with hyperglycemia  Hypertension associated with diabetes  Combined hyperlipidemia associated with type 2 diabetes mellitus  -continue current meds, has f/u with PCP in 3 weeks for routine follow up. She will bring outside labs from her Endo specialist.        Follow up for worsening Sx. Urgent care/ED precautions provided.      This note was created by combination of typed  and MModal dictation.  Transcription errors may be present.  If there are any questions, please contact me.                  ----------------------------------------------------------------------------------------------------------------------  Chest  HPI:  Patient Care Team:  García Saenz MD as PCP - General (Internal Medicine)  Amina Isidro NP as PCP - [facility] Attributed  Kannan Ruiz MD as Consulting Physician (Ophthalmology)    Andrade CARMONA is a 70 y.o. female with      Patient Active Problem List   Diagnosis    Combined hyperlipidemia associated with type 2 diabetes mellitus    Hypertension associated with diabetes    Diabetes mellitus type II, uncontrolled    History  of acute post-streptococcal glomerulonephritis    Edema leg    Obstructive sleep apnea treated with continuous positive airway pressure (CPAP)    Obesity, diabetes, and hypertension syndrome    Thyroid disease    Hypercalcemia    Renal insufficiency    Hyperparathyroidism    Vitamin D deficiency disease    High-density lipoprotein deficiency    Diabetes mellitus with renal manifestations, uncontrolled    Osteoarthrosis, localized, primary, knee    History of stroke       Patient presents today for :  Urinary Tract Infection    Pt complains of burning and bladder pressure/pain with frequent urination that started yesterday, feels like it's getting better today, but still having frequency. She denies flank pain, but has mild bladder irritation, no hematuria  No genital bleeding/VD    HTN/DM - patient is compliant with current medication regimen with good BP control at home - no side effects. Denies CP/SOB/leg swelling. She does have uncontrolled diabetes and takes insulin as prescribed.  She has a follow-up for routine health assessment with her PCP in about 3 weeks, and will bring recent labs from her endocrinologist for her PCP to review at that time.  labs       Additional ROS    No F/C/wt changes/fatigue  No CP/SOB/palpitations/swelling  No cough/wheezing  No nausea/vomiting/abd pain/no diarrhea, no constipation  No muscle aches/joint pain  No rashes  No MSK weakness/HA/tingling/numbness      Patient Active Problem List   Diagnosis    Combined hyperlipidemia associated with type 2 diabetes mellitus    Hypertension associated with diabetes    Diabetes mellitus type II, uncontrolled    History of acute post-streptococcal glomerulonephritis    Edema leg    Obstructive sleep apnea treated with continuous positive airway pressure (CPAP)    Obesity, diabetes, and hypertension syndrome    Thyroid disease    Hypercalcemia    Renal insufficiency    Hyperparathyroidism    Vitamin D deficiency disease  "   High-density lipoprotein deficiency    Diabetes mellitus with renal manifestations, uncontrolled    Osteoarthrosis, localized, primary, knee    History of stroke       Current Medications  Medications reviewed and updated.       Current Outpatient Medications:     alendronate (FOSAMAX) 70 MG tablet, TAKE 1 TABLET ONCE PER WEEK, Disp: , Rfl: 1    apixaban (ELIQUIS) 5 mg Tab, Take 5 mg by mouth 2 (two) times daily., Disp: , Rfl:     atorvastatin (LIPITOR) 20 MG tablet, TAKE 1 TABLET(20 MG) BY MOUTH EVERY DAY, Disp: 30 tablet, Rfl: 12    BD ULTRA-FINE REYNA PEN NEEDLES 32 x 5/32 " Ndle, , Disp: , Rfl:     clobetasol (TEMOVATE) 0.05 % external solution, Apply 0.05 % topically 2 (two) times daily., Disp: , Rfl:     CONTOUR TEST STRIPS Strp, , Disp: , Rfl: 4    diltiaZEM (CARTIA XT) 240 MG 24 hr capsule, Take 1 capsule (240 mg total) by mouth once daily., Disp: 90 capsule, Rfl: 3    ergocalciferol (ERGOCALCIFEROL) 50,000 unit Cap, Take 50,000 Units by mouth every 7 days. , Disp: , Rfl:     fluticasone propionate (FLONASE) 50 mcg/actuation nasal spray, SHAKE LIQUID AND USE 1 SPRAY(50 MCG) IN EACH NOSTRIL EVERY DAY, Disp: 16 mL, Rfl: 0    glipiZIDE (GLUCOTROL) 10 MG TR24, Take 1 tablet (10 mg total) by mouth once daily., Disp: 30 tablet, Rfl: 3    hydroCHLOROthiazide (HYDRODIURIL) 25 MG tablet, TAKE 1 TABLET(25 MG) BY MOUTH EVERY DAY, Disp: 30 tablet, Rfl: 1    insulin lispro (HUMALOG) 100 unit/mL injection, Inject into the skin 2 (two) times daily with meals. Uses a sliding scale, Disp: , Rfl:     ipratropium (ATROVENT) 0.03 % nasal spray, USE 2 SPRAYS IN EACH NOSTRIL THREE TIMES DAILY FOR 10 DAYS, Disp: 30 mL, Rfl: 0    LEVEMIR FLEXTOUCH U-100 INSULN 100 unit/mL (3 mL) InPn pen, INJECT 10 TO 15 UNITS UNDER THE SKIN NIGHTLY, Disp: 90 mL, Rfl: 3    omega-3 fatty acids-vitamin E (FISH OIL) 1,000 mg Cap, Take 1 capsule by mouth 3 (three) times daily. 1 Capsule Oral Twice a day (Patient taking differently: " Take 2 capsules by mouth Daily. 1 Capsule Oral Twice a day), Disp: 90 each, Rfl: 0    SITagliptin (JANUVIA) 100 MG Tab, Half a day, Disp: 90 tablet, Rfl: 3    telmisartan (MICARDIS) 80 MG Tab, TAKE 1 TABLET(80 MG) BY MOUTH EVERY DAY, Disp: 30 tablet, Rfl: 6    temazepam (RESTORIL) 15 mg Cap, TAKE 1 CAPSULE BY MOUTH NIGHTLY AS NEEDED, Disp: , Rfl: 2    triamcinolone acetonide 0.1% (KENALOG) 0.1 % cream, Apply 0.1 each topically 2 (two) times daily., Disp: , Rfl:     clopidogrel (PLAVIX) 75 mg tablet, TAKE 1 TABLET(75 MG) BY MOUTH EVERY DAY (Patient not taking: Reported on 8/19/2020), Disp: 30 tablet, Rfl: 6    clopidogrel (PLAVIX) 75 mg tablet, TAKE 1 TABLET(75 MG) BY MOUTH EVERY DAY (Patient not taking: Reported on 8/19/2020), Disp: 30 tablet, Rfl: 12    oxybutynin (DITROPAN-XL) 5 MG TR24, Take 1 tablet (5 mg total) by mouth once daily., Disp: 30 tablet, Rfl: 11    Past Surgical History:   Procedure Laterality Date    APPENDECTOMY      HYSTERECTOMY      JOINT REPLACEMENT Right     knee    rectum surgery      to stop bleeding    Sleep apnea Surgery      THYROIDECTOMY, PARTIAL  4/2005    80% of the Left and All of the Right    TONSILLECTOMY         Family History   Problem Relation Age of Onset    Cancer Mother         Breast    Cancer Father         Stomach       Social History     Socioeconomic History    Marital status:      Spouse name: Not on file    Number of children: Not on file    Years of education: Not on file    Highest education level: Not on file   Occupational History    Not on file   Social Needs    Financial resource strain: Not on file    Food insecurity     Worry: Not on file     Inability: Not on file    Transportation needs     Medical: Not on file     Non-medical: Not on file   Tobacco Use    Smoking status: Never Smoker    Smokeless tobacco: Never Used   Substance and Sexual Activity    Alcohol use: Yes     Comment: rarely    Drug use: No    Sexual activity:  Not on file   Lifestyle    Physical activity     Days per week: Not on file     Minutes per session: Not on file    Stress: Not on file   Relationships    Social connections     Talks on phone: Not on file     Gets together: Not on file     Attends [employer] service: Not on file     Active member of club or organization: Not on file     Attends meetings of clubs or organizations: Not on file     Relationship status: Not on file   Other Topics Concern    Not on file   Social History Narrative    Not on file           Allergies   Review of patient's allergies indicates:  No Known Allergies          Review of Systems  See HPI      Physical Exam  /70 (BP Location: Right arm, Patient Position: Sitting, BP Method: Large (Manual))   Pulse 88   Temp 97.7 °F (36.5 °C) (Oral)   Ht 5' (1.524 m)   Wt 106.1 kg (233 lb 14.5 oz)   SpO2 97%   BMI 45.68 kg/m²     GEN: NAD, well developed, pleasant, well nourished  HEENT: NCAT, PERRLA, EOMI, sclera clear, anicteric  NECK: normal, supple with midline trachea, no LAD, no thyromegaly  LUNGS: CTAB, no w/r/r, normal effort  HEART: RRR, normal S1 and S2, no m/r/g, no palpitations, normal pulses  ABD: s/nt/nd, NABS, no suprapubic tenderness. no CVA/flank TTP  SKIN: warm and dry with normal turgor, no rashes, no other lesions  PSYCH: AOx3, appropriate mood and affect

## 2020-08-21 LAB — BACTERIA UR CULT: ABNORMAL

## 2020-08-25 ENCOUNTER — PATIENT OUTREACH (OUTPATIENT)
Dept: ADMINISTRATIVE | Facility: HOSPITAL | Age: 70
End: 2020-08-25

## 2020-09-08 ENCOUNTER — OFFICE VISIT (OUTPATIENT)
Dept: FAMILY MEDICINE | Facility: CLINIC | Age: 70
End: 2020-09-08
Payer: MEDICARE

## 2020-09-08 VITALS
RESPIRATION RATE: 18 BRPM | DIASTOLIC BLOOD PRESSURE: 70 MMHG | SYSTOLIC BLOOD PRESSURE: 130 MMHG | HEIGHT: 60 IN | HEART RATE: 70 BPM | WEIGHT: 235.69 LBS | OXYGEN SATURATION: 96 % | BODY MASS INDEX: 46.27 KG/M2

## 2020-09-08 DIAGNOSIS — E78.2 COMBINED HYPERLIPIDEMIA ASSOCIATED WITH TYPE 2 DIABETES MELLITUS: ICD-10-CM

## 2020-09-08 DIAGNOSIS — E66.01 MORBID OBESITY: ICD-10-CM

## 2020-09-08 DIAGNOSIS — Z79.4 LONG-TERM INSULIN USE: ICD-10-CM

## 2020-09-08 DIAGNOSIS — Z86.73 HISTORY OF STROKE: ICD-10-CM

## 2020-09-08 DIAGNOSIS — E55.9 VITAMIN D DEFICIENCY DISEASE: ICD-10-CM

## 2020-09-08 DIAGNOSIS — I15.2 HYPERTENSION ASSOCIATED WITH DIABETES: ICD-10-CM

## 2020-09-08 DIAGNOSIS — E78.6 HIGH-DENSITY LIPOPROTEIN DEFICIENCY: ICD-10-CM

## 2020-09-08 DIAGNOSIS — G47.33 OSA (OBSTRUCTIVE SLEEP APNEA): ICD-10-CM

## 2020-09-08 DIAGNOSIS — E11.59 HYPERTENSION ASSOCIATED WITH DIABETES: ICD-10-CM

## 2020-09-08 DIAGNOSIS — I48.21 PERMANENT ATRIAL FIBRILLATION: ICD-10-CM

## 2020-09-08 DIAGNOSIS — E21.3 HYPERPARATHYROIDISM: ICD-10-CM

## 2020-09-08 DIAGNOSIS — E83.52 HYPERCALCEMIA: ICD-10-CM

## 2020-09-08 DIAGNOSIS — E11.69 COMBINED HYPERLIPIDEMIA ASSOCIATED WITH TYPE 2 DIABETES MELLITUS: ICD-10-CM

## 2020-09-08 DIAGNOSIS — Z86.010 HISTORY OF COLONIC POLYPS: ICD-10-CM

## 2020-09-08 DIAGNOSIS — G47.00 INSOMNIA, UNSPECIFIED TYPE: ICD-10-CM

## 2020-09-08 DIAGNOSIS — E11.65 UNCONTROLLED TYPE 2 DIABETES MELLITUS WITH HYPERGLYCEMIA: ICD-10-CM

## 2020-09-08 DIAGNOSIS — M19.012 PRIMARY OSTEOARTHRITIS OF LEFT SHOULDER: ICD-10-CM

## 2020-09-08 DIAGNOSIS — Z96.652 S/P TKR (TOTAL KNEE REPLACEMENT), LEFT: ICD-10-CM

## 2020-09-08 DIAGNOSIS — N28.9 RENAL INSUFFICIENCY: ICD-10-CM

## 2020-09-08 PROCEDURE — 99215 OFFICE O/P EST HI 40 MIN: CPT | Mod: S$PBB,,, | Performed by: INTERNAL MEDICINE

## 2020-09-08 PROCEDURE — 99215 PR OFFICE/OUTPT VISIT, EST, LEVL V, 40-54 MIN: ICD-10-PCS | Mod: S$PBB,,, | Performed by: INTERNAL MEDICINE

## 2020-09-08 PROCEDURE — 99213 OFFICE O/P EST LOW 20 MIN: CPT | Mod: PBBFAC,PO | Performed by: INTERNAL MEDICINE

## 2020-09-08 PROCEDURE — 99999 PR PBB SHADOW E&M-EST. PATIENT-LVL III: CPT | Mod: PBBFAC,,, | Performed by: INTERNAL MEDICINE

## 2020-09-08 PROCEDURE — 99999 PR PBB SHADOW E&M-EST. PATIENT-LVL III: ICD-10-PCS | Mod: PBBFAC,,, | Performed by: INTERNAL MEDICINE

## 2020-09-08 RX ORDER — GABAPENTIN 100 MG/1
100 CAPSULE ORAL 3 TIMES DAILY
Qty: 90 CAPSULE | Refills: 11 | Status: SHIPPED | OUTPATIENT
Start: 2020-09-08 | End: 2021-09-08

## 2020-09-08 RX ORDER — TEMAZEPAM 15 MG/1
CAPSULE ORAL
Qty: 30 CAPSULE | Refills: 5 | Status: SHIPPED | OUTPATIENT
Start: 2020-09-08 | End: 2021-03-31

## 2020-09-08 RX ORDER — INSULIN GLARGINE 300 U/ML
INJECTION, SOLUTION SUBCUTANEOUS
Qty: 9 SYRINGE | Refills: 12 | Status: SHIPPED | OUTPATIENT
Start: 2020-09-08 | End: 2020-09-16 | Stop reason: SDUPTHER

## 2020-09-08 NOTE — PROGRESS NOTES
Chief complaint: Physical, last seen 4/19    70-year-old white female .  She presents today for her physical although she has Medicare which does not formally cover physical her health assessment will be done either way.  She is followed by local Endocrine and she had labs about a month ago.  She will bring them and drop them off but for got today.  Her A1c did improve down to 6.5 her so but then she said it was back up into 7.9.  She is on Januvia 50 mg now instead of the previous medicine.  Her PTH level was better.  Her vitamin-D was better.  Her local cardiologist said the persistent slight hypercalcemia may well be due to the hydrochlorothiazide.    Mammo 6/20    [facility] GI, 3 polyps 2/19 --3 yrs    She did have a left knee replacement in September 2018 is now walking without a cane since November has trying to walk 1 mi per day    She also developed some pain in the right ear dizziness and reduced hearing and is seeing an ENT at [facility].  She is set to get an MRI as she does have significantly reduced hearing on the right and may have an acoustic neuroma.    Another issue is insomnia.  She can't fall asleep.  She is using her CPAP.  She is on getting an hour have asleep when she does fall asleep.  She did try Restoril from her  and it worked well.  She does not need to take it every night only when she really can't get any sleep for a couple of nights.  She did make a call and found out it was the 30 mg pill.  We discussed trying the 15 mg pill of we discussed coverage issues and so forth which could be problematic    She does have some chronic left shoulder pain was told by Dr. collazo that she does have some bone spurs.  Today her pain appears to be a lot of left trapezius muscle pain likely compensation for some mild impingement which appears to be more so the acromioclavicular joint on the left.  We discussed use of Tylenol, application of heat to the trapezius muscle.  She does sleep on  that side which is aggravating the shoulder pain obviously it may persist until she makes adjustments.    Patient occasionally takes a tramadol.  She does not need a refill she has some left over.  She takes very intermittently.  Patient counseled at length regarding all these issues.  She was counseled regarding her hyperparathyroidism.  I do not have the details but did point out to her that she does have some ongoing mild vitamin D deficiency which could be causing her hyperparathyroidism is wellTotal time over 45 minutes with over 50% counseling.    AFib- Dr Schwarz    ROS:   CONST: weight stable. EYES: no vision change. ENT: no sore throat. CV: no chest pain w/ exertion. RESP: no shortness of breath. GI: no nausea, vomiting, diarrhea. No dysphagia. : no urinary issues. MUSCULOSKELETAL: no other new myalgias or arthralgias. SKIN: no new changes. NEURO: no focal deficits. PSYCH: no new issues. ENDOCRINE: no polyuria. HEME: no lymph nodes. ALLERGY: no general pruritis.    PAST MEDICAL HISTORY:  1. Hypertension  2. Diabetes Mellitus Type II, follows with Dr. Martínez - Endocrinology  3. History of Post Streptococcal Glomerulonephritis  4. Chronic LE Edema  5. Sleep Apnea: 1st sleep study at [facility], 2nd at [facility], 3rd at [facility]  6. Obesity  7. Hyperlipidemia  8. Postmenopausal  9. Colonoscopy 12/2003, Dr. Stewart - [facility] GI, 3 polyps 2/19 --3 yrs  10.  MNGoiter  11. Renal insufficiency  12. Hyperparathyroidism/hypercalcemia  13. Vit D def  14. Low HDL  15. STROKE with speech deficit 2013 at .  Now on Plavix.   16.  Right knee replacement 2016, left knee replacement 2018  17.  Urinary incontinence  18. AFib- Dr Schwarz    PAST SURGICAL HISTORY:   1. Hysterectomy 1992 at [facility] secondary to fibroids  2. Partial Thyroidectomy 4/2005: 80% of Left and All of Right removed  3. Sleep Apnea surgery  4. Appendectomy  5. Tonsillectomy    SOCIAL HISTORY: No tobacco.   2015. Works at  [employer].    FAMILY HISTORY:   1. Mom  from Breast Cancer  2. Dad  from Stomach Cancer secondary to Asbestos    Gen: no distress  EYES: conjunctiva clear, non-icteric, PERRL  ENT: nose clear, nasal mucosa normal, oropharynx clear and moist, teeth good  NECK:supple, thyroid non-palpable  RESP: effort is good, lungs clear  CV: heart RRR w/o murmur, gallops or rubs; no carotid bruits, no edema  GI: abdomen soft, non-distended, non-tender, no hepatosplenomegaly  MS: gait normal, no clubbing or cyanosis of the digits,  SKIN: no rashes, warm to touch  Andrade CARMONA was seen today for annual exam and shoulder pain.    Diagnoses and all orders for this visit:    Uncontrolled type 2 diabetes mellitus with stage 3 chronic kidney disease, with long-term current use of insulin - per endocrine    History of colonic polyps    Uncontrolled type 2 diabetes mellitus with hyperglycemia    Hypertension associated with diabetes    Combined hyperlipidemia associated with type 2 diabetes mellitus    Vitamin D deficiency disease    MILLIE (obstructive sleep apnea) on cpap    Diabetes mellitus with renal manifestations, uncontrolled    Renal insufficiency    High-density lipoprotein deficiency    Hyperparathyroidism    Primary osteoarthritis of left shoulder    S/P TKR (total knee replacement), left    Hypercalcemia    Insomnia, unspecified type- med refillled    History of stroke    Morbid obesity    Long-term insulin use    Permanent atrial fibrillation- new -per cards    Other orders  -     gabapentin (NEURONTIN) 100 MG capsule; Take 1 capsule (100 mg total) by mouth 3 (three) times daily.  -     temazepam (RESTORIL) 15 mg Cap; TAKE 1 CAPSULE BY MOUTH NIGHTLY AS NEEDED  -     insulin glargine U-300 conc (TOUJEO MAX U-300 SOLOSTAR) 300 unit/mL (3 mL) InPn; 15 units a day

## 2020-09-15 NOTE — TELEPHONE ENCOUNTER
----- Message from Janiya Martini sent at 9/15/2020 11:22 AM CDT -----  Type: Patient Call Back    Who called: Devi with [employer]    What is the request in detail: Calling for clarification on script for -- Trujeo Star pen---    Can the clinic reply by [facility]? No     Would the patient rather a call back or a response via [facility]? Call back     Best call back number: [phone]    Additional Information: Ref # [id number]

## 2020-09-15 NOTE — TELEPHONE ENCOUNTER
No problem, probably need to just double check with the patient how much she is taking and we can make the prescription more accurate

## 2020-09-15 NOTE — TELEPHONE ENCOUNTER
Devi/ Pharmacist said to alert prescriber that Patient's insulin glargine U-300 conc (TOUJEO MAX U-300 SOLOSTAR) 300 unit/mL (3 mL) InPn onluy comes in even number dosing but the Sig is for 15 units daily.  Please advise.

## 2020-09-16 ENCOUNTER — TELEPHONE (OUTPATIENT)
Dept: FAMILY MEDICINE | Facility: CLINIC | Age: 70
End: 2020-09-16

## 2020-09-16 NOTE — TELEPHONE ENCOUNTER
Spoke with patient who states she takes 15 units/day   Start cipro, take with food. Take probiotic while you are on antibiotic.  Diflucan for itching/yeast infection if needed  Make appointment with primary care for diabetes management        Infección Renal [Kidney Infection, Female: Adult]    Holley infección de los riñones, también se llama “pielonefritis” (pyelonephritis). Suele comenzar ronn holley infección de la vejiga (cistitis [cystitis]) que se expande a los riñones. La pielonefritis (pyelonephritis) es holley afección más seria que holley infección de la vejiga (bladder infection). Si no se la trata adecuadamente, puede provocar holley enfermedad seria.  Los síntomas usuales incluyen un dolor continuo en la espalda, en la parte lateral o inferior del abdomen. Otros síntomas que puede incluir son: fiebre, escalofríos, náuseas, vómito, urgencia de orinar y ardor al orinar.  Cuidados En [place]:  1. Quédese en santoyo casa. No vaya al trabajo o la escuela. Descanse en cama hasta que la fiebre desaparezca y usted se sienta mejor.  2. Elo mucha cantidad de líquido (al menos, entre 6 y 8 vasos por día, excepto que le hayan indicado limitar los líquidos por otras razones médicas). Eso hará que el medicamento ingrese mejor al sistema urinario y arrastrará las bacterias fuera de santoyo cuerpo.  3. Evite mantener relaciones sexuales hasta ramona terminado todo el medicamento y todos los síntomas hayan desaparecido.  4. Evite la cafeína, el alcohol y las comidas muy condimentadas que puedan irritar los riñones y la vejiga.  5. Puede usar acetaminofén (acetaminophen) (Tylenol) o ibuprofeno (ibuprofen) (Motrin o Advil) para controlar el dolor, a menos que le hayan recetado otro medicamento. [NOTA: Si tiene holley enfermedad hepática o renal crónica (chronic liver or kidney disease), o ha tenido alguna vez holley úlcera estomacal (stomach ulcer) o sangrado gastrointestinal (GI bleeding), consulte con santoyo médico antes de jd estos medicamentos.]  Programe holley VISITA DE CONTROL con santoyo médico, o según le  indique nuestro personal médico, para repetir la prueba de orina en 10 días. De yelena manera, podrá asegurar que la infección (infection) haya desaparecido completamente.  [NOTA: Si le moran hecho holley radiografía (X-ray) o holley tomografía computarizada (CT scan), éstas serán evaluadas por un especialista. Le informarán de los nuevos hallazgos que puedan afectar la atención médica que necesita.]  Busque Prontamente Atención Médica  si algo de lo siguiente ocurre:  · Fiebre superior a los 100.4°F (38.0°C) después de ramona tomado antibióticos fransico 48 horas.  · No hay mejoría después de silver días de tratamiento.  · Mayor dolor en la espalda o el abdomen.  · Vómito persistente o la incapacidad de jd el medicamento oral.  · Debilidad, mareo o desmayo.  © 8445-4218 [employer]. [address] [place] [address], [place] [address]. Todos los derechos reservados. Esta información no pretende sustituir la atención médica profesional. Sólo santoyo médico puede diagnosticar y tratar un problema de madison.

## 2020-09-16 NOTE — TELEPHONE ENCOUNTER
----- Message from Margot Rivas sent at 9/16/2020  8:02 AM CDT -----  Regarding: call back  Type: Patient Call Back    Who called:Andrade     What is the request in detail: The patient is returning a call back to Ms. Loera     Can the clinic reply by [facility]?no    Would the patient rather a call back or a response via [facility]? Call back    Best call back number:[phone]

## 2020-09-17 RX ORDER — INSULIN GLARGINE 300 U/ML
INJECTION, SOLUTION SUBCUTANEOUS
Qty: 9 SYRINGE | Refills: 12 | Status: SHIPPED | OUTPATIENT
Start: 2020-09-17 | End: 2020-09-18 | Stop reason: SDUPTHER

## 2020-09-18 RX ORDER — INSULIN GLARGINE 300 U/ML
INJECTION, SOLUTION SUBCUTANEOUS
Qty: 9 SYRINGE | Refills: 12 | Status: SHIPPED | OUTPATIENT
Start: 2020-09-18

## 2020-09-18 NOTE — TELEPHONE ENCOUNTER
Jyoti/ Pharmacist is said that this is a second request to clarify the dosage of Patient's insulin glargine U-300 conc (TOUJEO MAX U-300 SOLOSTAR) 300 unit/mL (3 mL) InPn.  She said that it should be 14 units or 16 units daily because the pen will not calibrate to 15 units as ordered.  Spoke with Dr. Saenz, who said to queue Rx. for 14 units daily.

## 2020-12-02 RX ORDER — BLOOD SUGAR DIAGNOSTIC
4 STRIP MISCELLANEOUS 2 TIMES DAILY
Qty: 200 STRIP | Refills: 4 | Status: SHIPPED | OUTPATIENT
Start: 2020-12-02 | End: 2020-12-07

## 2020-12-02 NOTE — TELEPHONE ENCOUNTER
----- Message from Matt Carver sent at 12/2/2020 12:28 PM CST -----  Type: RX Refill Request    Who Called:  pt     Have you contacted your pharmacy: no     Refill or New Rx: refill     RX Name and Strength: SITagliptin (JANUVIA) 100 MG Tab, CONTOUR TEST STRIPS Strp    How is the patient currently taking it? (ex. 1XDay):    Is this a 30 day or 90 day RX: 90    Preferred Pharmacy with phone number:   [id number] - [address] [address]  Phone: [phone] Fax: [phone]    Local or Mail Order: local     Ordering Provider:    Would the patient rather a call back or a response via [facility]?  Call back     Best Call Back Number: [phone]    Additional Information:

## 2020-12-08 ENCOUNTER — TELEPHONE (OUTPATIENT)
Dept: FAMILY MEDICINE | Facility: CLINIC | Age: 70
End: 2020-12-08

## 2020-12-08 RX ORDER — BLOOD SUGAR DIAGNOSTIC
1 STRIP MISCELLANEOUS 2 TIMES DAILY
Qty: 200 STRIP | Refills: 12 | Status: SHIPPED | OUTPATIENT
Start: 2020-12-08

## 2020-12-08 NOTE — TELEPHONE ENCOUNTER
----- Message from Jeff Rojas sent at 12/8/2020  1:07 PM CST -----  Name of Who is Calling: AMISH GAVIRAI [[id number]]    What is the request in detail: AMISH GAVIRIA [[id number]] is calling in regards o RX sent over on December 2  to Pharmacy ... Please contact to further discuss and advise      Can the clinic reply by [facility]: no     What Number to Call Back if not in [facility]:  [phone]

## 2020-12-08 NOTE — TELEPHONE ENCOUNTER
"----- Message from Mary Mackay sent at 12/8/2020  3:22 PM CST -----  Regarding: Returning Call      Name of Who is Calling:   Andrade Ojeda      What is the request in detail:   Patient called stating, "she's returning your call and would like you to please call again."   Please further advise      Reply by [facility]: no      Call Back:  [phone]                                        "

## 2020-12-11 RX ORDER — BLOOD SUGAR DIAGNOSTIC
200 STRIP MISCELLANEOUS 2 TIMES DAILY
Qty: 200 STRIP | Refills: 12 | Status: SHIPPED | OUTPATIENT
Start: 2020-12-11 | End: 2023-04-27

## 2020-12-11 NOTE — TELEPHONE ENCOUNTER
----- Message from Caren Rojas sent at 12/11/2020  1:06 PM CST -----  Type:  Pharmacy Calling to Clarify an RX    Name of Caller: Erica Devine     Pharmacy Name:[id number] - [place] - [address] [facility] [address] AT [facility] [phone] (Phone)  [phone] (Fax)        Prescription Name: SITagliptin (JANUVIA) 100 MG Tab        What do they need to clarify?  Patient's directions says to take half a tablet , patient's pharmacy states the patient would like to know if she can get a script for the 50mg instead so she would not have to cut the tablet in half. Randal call     Can you be contacted via [facility]? Call

## 2021-01-24 ENCOUNTER — NURSE TRIAGE (OUTPATIENT)
Dept: ADMINISTRATIVE | Facility: CLINIC | Age: 71
End: 2021-01-24

## 2021-02-23 ENCOUNTER — TELEPHONE (OUTPATIENT)
Dept: FAMILY MEDICINE | Facility: CLINIC | Age: 71
End: 2021-02-23

## 2021-02-24 ENCOUNTER — TELEPHONE (OUTPATIENT)
Dept: FAMILY MEDICINE | Facility: CLINIC | Age: 71
End: 2021-02-24

## 2021-02-25 ENCOUNTER — TELEPHONE (OUTPATIENT)
Dept: FAMILY MEDICINE | Facility: CLINIC | Age: 71
End: 2021-02-25

## 2021-03-24 ENCOUNTER — PATIENT OUTREACH (OUTPATIENT)
Dept: ADMINISTRATIVE | Facility: HOSPITAL | Age: 71
End: 2021-03-24

## 2021-03-24 DIAGNOSIS — E11.9 TYPE 2 DIABETES MELLITUS WITHOUT COMPLICATION, WITHOUT LONG-TERM CURRENT USE OF INSULIN: Primary | ICD-10-CM

## 2021-03-31 ENCOUNTER — OFFICE VISIT (OUTPATIENT)
Dept: FAMILY MEDICINE | Facility: CLINIC | Age: 71
End: 2021-03-31
Payer: COMMERCIAL

## 2021-03-31 ENCOUNTER — EXTERNAL CHRONIC CARE MANAGEMENT (OUTPATIENT)
Dept: PRIMARY CARE CLINIC | Facility: CLINIC | Age: 71
End: 2021-03-31
Payer: MEDICARE

## 2021-03-31 VITALS
HEART RATE: 66 BPM | BODY MASS INDEX: 46.88 KG/M2 | DIASTOLIC BLOOD PRESSURE: 68 MMHG | OXYGEN SATURATION: 97 % | HEIGHT: 60 IN | SYSTOLIC BLOOD PRESSURE: 120 MMHG | TEMPERATURE: 98 F | WEIGHT: 238.75 LBS

## 2021-03-31 DIAGNOSIS — E66.01 MORBID OBESITY: ICD-10-CM

## 2021-03-31 DIAGNOSIS — E11.59 HYPERTENSION ASSOCIATED WITH DIABETES: ICD-10-CM

## 2021-03-31 DIAGNOSIS — E55.9 VITAMIN D DEFICIENCY DISEASE: ICD-10-CM

## 2021-03-31 DIAGNOSIS — E21.3 HYPERPARATHYROIDISM: ICD-10-CM

## 2021-03-31 DIAGNOSIS — G47.33 OSA (OBSTRUCTIVE SLEEP APNEA): ICD-10-CM

## 2021-03-31 DIAGNOSIS — I48.21 PERMANENT ATRIAL FIBRILLATION: ICD-10-CM

## 2021-03-31 DIAGNOSIS — F43.9 SITUATIONAL STRESS: Primary | ICD-10-CM

## 2021-03-31 DIAGNOSIS — Z86.73 HISTORY OF STROKE: ICD-10-CM

## 2021-03-31 DIAGNOSIS — M19.012 PRIMARY OSTEOARTHRITIS OF LEFT SHOULDER: ICD-10-CM

## 2021-03-31 DIAGNOSIS — Z79.4 LONG-TERM INSULIN USE: ICD-10-CM

## 2021-03-31 DIAGNOSIS — E78.2 COMBINED HYPERLIPIDEMIA ASSOCIATED WITH TYPE 2 DIABETES MELLITUS: ICD-10-CM

## 2021-03-31 DIAGNOSIS — G47.00 INSOMNIA, UNSPECIFIED TYPE: ICD-10-CM

## 2021-03-31 DIAGNOSIS — E11.69 COMBINED HYPERLIPIDEMIA ASSOCIATED WITH TYPE 2 DIABETES MELLITUS: ICD-10-CM

## 2021-03-31 DIAGNOSIS — E11.65 UNCONTROLLED TYPE 2 DIABETES MELLITUS WITH HYPERGLYCEMIA: ICD-10-CM

## 2021-03-31 DIAGNOSIS — Z86.010 HISTORY OF COLONIC POLYPS: ICD-10-CM

## 2021-03-31 DIAGNOSIS — I15.2 HYPERTENSION ASSOCIATED WITH DIABETES: ICD-10-CM

## 2021-03-31 DIAGNOSIS — N28.9 RENAL INSUFFICIENCY: ICD-10-CM

## 2021-03-31 DIAGNOSIS — E78.6 HIGH-DENSITY LIPOPROTEIN DEFICIENCY: ICD-10-CM

## 2021-03-31 PROCEDURE — 99214 OFFICE O/P EST MOD 30 MIN: CPT | Mod: PBBFAC,PO | Performed by: INTERNAL MEDICINE

## 2021-03-31 PROCEDURE — 99999 PR PBB SHADOW E&M-EST. PATIENT-LVL IV: CPT | Mod: PBBFAC,,, | Performed by: INTERNAL MEDICINE

## 2021-03-31 PROCEDURE — 99490 CHRNC CARE MGMT STAFF 1ST 20: CPT | Mod: S$GLB,,, | Performed by: INTERNAL MEDICINE

## 2021-03-31 PROCEDURE — 99490 PR CHRONIC CARE MGMT, 1ST 20 MIN: ICD-10-PCS | Mod: S$GLB,,, | Performed by: INTERNAL MEDICINE

## 2021-03-31 PROCEDURE — 99214 PR OFFICE/OUTPT VISIT, EST, LEVL IV, 30-39 MIN: ICD-10-PCS | Mod: S$GLB,,, | Performed by: INTERNAL MEDICINE

## 2021-03-31 PROCEDURE — 99999 PR PBB SHADOW E&M-EST. PATIENT-LVL IV: ICD-10-PCS | Mod: PBBFAC,,, | Performed by: INTERNAL MEDICINE

## 2021-03-31 PROCEDURE — 99214 OFFICE O/P EST MOD 30 MIN: CPT | Mod: S$GLB,,, | Performed by: INTERNAL MEDICINE

## 2021-03-31 RX ORDER — CLONAZEPAM 0.5 MG/1
0.5 TABLET ORAL NIGHTLY
Qty: 30 TABLET | Refills: 3 | Status: SHIPPED | OUTPATIENT
Start: 2021-03-31 | End: 2022-03-31

## 2021-03-31 RX ORDER — TRAMADOL HYDROCHLORIDE 50 MG/1
TABLET ORAL
Qty: 50 TABLET | Refills: 3 | Status: SHIPPED | OUTPATIENT
Start: 2021-03-31 | End: 2023-04-27

## 2021-04-30 ENCOUNTER — EXTERNAL CHRONIC CARE MANAGEMENT (OUTPATIENT)
Dept: PRIMARY CARE CLINIC | Facility: CLINIC | Age: 71
End: 2021-04-30
Payer: MEDICARE

## 2021-04-30 PROCEDURE — 99490 CHRNC CARE MGMT STAFF 1ST 20: CPT | Mod: S$GLB,,, | Performed by: INTERNAL MEDICINE

## 2021-04-30 PROCEDURE — 99490 PR CHRONIC CARE MGMT, 1ST 20 MIN: ICD-10-PCS | Mod: S$GLB,,, | Performed by: INTERNAL MEDICINE

## 2021-05-31 ENCOUNTER — EXTERNAL CHRONIC CARE MANAGEMENT (OUTPATIENT)
Dept: PRIMARY CARE CLINIC | Facility: CLINIC | Age: 71
End: 2021-05-31
Payer: MEDICARE

## 2021-05-31 PROCEDURE — 99490 CHRNC CARE MGMT STAFF 1ST 20: CPT | Mod: S$GLB,,, | Performed by: INTERNAL MEDICINE

## 2021-05-31 PROCEDURE — 99490 PR CHRONIC CARE MGMT, 1ST 20 MIN: ICD-10-PCS | Mod: S$GLB,,, | Performed by: INTERNAL MEDICINE

## 2021-06-30 ENCOUNTER — EXTERNAL CHRONIC CARE MANAGEMENT (OUTPATIENT)
Dept: PRIMARY CARE CLINIC | Facility: CLINIC | Age: 71
End: 2021-06-30
Payer: MEDICARE

## 2021-06-30 PROCEDURE — 99490 CHRNC CARE MGMT STAFF 1ST 20: CPT | Mod: S$GLB,,, | Performed by: INTERNAL MEDICINE

## 2021-06-30 PROCEDURE — 99490 PR CHRONIC CARE MGMT, 1ST 20 MIN: ICD-10-PCS | Mod: S$GLB,,, | Performed by: INTERNAL MEDICINE

## 2021-07-19 ENCOUNTER — TELEPHONE (OUTPATIENT)
Dept: FAMILY MEDICINE | Facility: CLINIC | Age: 71
End: 2021-07-19

## 2021-10-12 LAB
LEFT EYE DM RETINOPATHY: NEGATIVE
RIGHT EYE DM RETINOPATHY: NEGATIVE

## 2021-10-21 ENCOUNTER — PATIENT OUTREACH (OUTPATIENT)
Dept: ADMINISTRATIVE | Facility: HOSPITAL | Age: 71
End: 2021-10-21

## 2021-10-21 RX ORDER — GLIPIZIDE 10 MG/1
10 TABLET ORAL 2 TIMES DAILY
COMMUNITY
Start: 2021-05-25 | End: 2023-04-27

## 2021-10-21 RX ORDER — ATORVASTATIN CALCIUM 40 MG/1
40 TABLET, FILM COATED ORAL DAILY
COMMUNITY
Start: 2021-06-22 | End: 2023-04-27

## 2021-10-21 RX ORDER — ATORVASTATIN CALCIUM 40 MG/1
1 TABLET, FILM COATED ORAL DAILY
COMMUNITY
Start: 2021-03-29 | End: 2023-04-27

## 2021-10-21 RX ORDER — DILTIAZEM HYDROCHLORIDE 240 MG/1
1 CAPSULE, EXTENDED RELEASE ORAL DAILY
COMMUNITY
Start: 2021-01-08 | End: 2023-04-27

## 2021-10-21 RX ORDER — PEN NEEDLE, DIABETIC 30 GX3/16"
NEEDLE, DISPOSABLE MISCELLANEOUS
COMMUNITY
End: 2023-04-27

## 2021-10-21 RX ORDER — INSULIN LISPRO 100 [IU]/ML
INJECTION, SOLUTION INTRAVENOUS; SUBCUTANEOUS
COMMUNITY
Start: 2021-07-09

## 2021-10-21 RX ORDER — OFLOXACIN 3 MG/ML
SOLUTION/ DROPS OPHTHALMIC
COMMUNITY
End: 2023-04-27

## 2021-10-21 RX ORDER — TRAMADOL HYDROCHLORIDE 50 MG/1
50 TABLET ORAL EVERY 6 HOURS PRN
COMMUNITY
End: 2021-10-25 | Stop reason: SDUPTHER

## 2021-10-21 RX ORDER — TELMISARTAN 80 MG/1
1 TABLET ORAL DAILY
COMMUNITY
Start: 2021-01-08 | End: 2023-04-27

## 2021-10-21 RX ORDER — HYDROCHLOROTHIAZIDE 25 MG/1
1 TABLET ORAL DAILY
COMMUNITY
Start: 2021-01-08 | End: 2023-04-27

## 2021-10-21 RX ORDER — ALENDRONATE SODIUM 70 MG/1
TABLET ORAL
COMMUNITY
End: 2023-04-27

## 2021-10-21 RX ORDER — ERGOCALCIFEROL 1.25 MG/1
CAPSULE ORAL
COMMUNITY
End: 2023-04-27

## 2021-10-21 RX ORDER — INSULIN LISPRO 100 [IU]/ML
INJECTION, SOLUTION INTRAVENOUS; SUBCUTANEOUS
COMMUNITY
End: 2024-01-19 | Stop reason: SDUPTHER

## 2021-10-21 RX ORDER — PREDNISOLONE ACETATE 10 MG/ML
SUSPENSION/ DROPS OPHTHALMIC
COMMUNITY
End: 2023-04-27

## 2021-10-21 RX ORDER — GABAPENTIN 100 MG/1
100 CAPSULE ORAL
COMMUNITY
End: 2023-04-27

## 2021-10-21 RX ORDER — INSULIN DETEMIR 100 [IU]/ML
INJECTION, SOLUTION SUBCUTANEOUS
COMMUNITY

## 2021-10-21 RX ORDER — ATORVASTATIN CALCIUM 20 MG/1
TABLET, FILM COATED ORAL
COMMUNITY
End: 2023-04-27

## 2021-10-21 RX ORDER — GLIPIZIDE 10 MG/1
10 TABLET ORAL
COMMUNITY
Start: 2021-08-27 | End: 2021-11-25

## 2021-10-21 RX ORDER — AMOXICILLIN 500 MG/1
CAPSULE ORAL
COMMUNITY
End: 2023-04-27

## 2021-10-21 RX ORDER — GLIPIZIDE 10 MG/1
TABLET ORAL
COMMUNITY
End: 2023-04-27

## 2021-10-21 RX ORDER — DEXTROSE 4 G
1 TABLET,CHEWABLE ORAL
COMMUNITY
Start: 2021-01-25 | End: 2022-01-25

## 2021-10-21 RX ORDER — CLONAZEPAM 0.5 MG/1
0.5 TABLET ORAL
COMMUNITY
Start: 2021-03-31 | End: 2021-10-25 | Stop reason: SDUPTHER

## 2021-10-21 RX ORDER — INSULIN LISPRO 100 [IU]/ML
8 INJECTION, SOLUTION INTRAVENOUS; SUBCUTANEOUS
COMMUNITY
Start: 2021-10-18 | End: 2024-01-19 | Stop reason: SDUPTHER

## 2021-10-21 RX ORDER — INSULIN GLARGINE 300 U/ML
15 INJECTION, SOLUTION SUBCUTANEOUS
COMMUNITY
Start: 2021-01-25 | End: 2024-01-19 | Stop reason: SDUPTHER

## 2021-10-21 RX ORDER — AMOXICILLIN 500 MG/1
CAPSULE ORAL
COMMUNITY
Start: 2021-07-14 | End: 2023-04-27

## 2021-10-25 ENCOUNTER — OFFICE VISIT (OUTPATIENT)
Dept: FAMILY MEDICINE | Facility: CLINIC | Age: 71
End: 2021-10-25
Payer: MEDICARE

## 2021-10-25 VITALS
OXYGEN SATURATION: 95 % | HEART RATE: 70 BPM | SYSTOLIC BLOOD PRESSURE: 118 MMHG | BODY MASS INDEX: 44.75 KG/M2 | DIASTOLIC BLOOD PRESSURE: 58 MMHG | TEMPERATURE: 98 F | HEIGHT: 60 IN | WEIGHT: 227.94 LBS

## 2021-10-25 DIAGNOSIS — E11.69 COMBINED HYPERLIPIDEMIA ASSOCIATED WITH TYPE 2 DIABETES MELLITUS: ICD-10-CM

## 2021-10-25 DIAGNOSIS — E21.3 HYPERPARATHYROIDISM: ICD-10-CM

## 2021-10-25 DIAGNOSIS — E66.9 OBESITY, DIABETES, AND HYPERTENSION SYNDROME: ICD-10-CM

## 2021-10-25 DIAGNOSIS — E55.9 VITAMIN D DEFICIENCY DISEASE: ICD-10-CM

## 2021-10-25 DIAGNOSIS — E11.65 UNCONTROLLED TYPE 2 DIABETES MELLITUS WITH HYPERGLYCEMIA: ICD-10-CM

## 2021-10-25 DIAGNOSIS — G47.33 OSA (OBSTRUCTIVE SLEEP APNEA): ICD-10-CM

## 2021-10-25 DIAGNOSIS — L30.9 DERMATITIS: ICD-10-CM

## 2021-10-25 DIAGNOSIS — M79.606 PAIN OF LOWER EXTREMITY, UNSPECIFIED LATERALITY: ICD-10-CM

## 2021-10-25 DIAGNOSIS — Z86.73 HISTORY OF STROKE: ICD-10-CM

## 2021-10-25 DIAGNOSIS — G47.00 INSOMNIA, UNSPECIFIED TYPE: ICD-10-CM

## 2021-10-25 DIAGNOSIS — N28.9 RENAL INSUFFICIENCY: ICD-10-CM

## 2021-10-25 DIAGNOSIS — E66.01 MORBID OBESITY: ICD-10-CM

## 2021-10-25 DIAGNOSIS — E11.59 HYPERTENSION ASSOCIATED WITH DIABETES: ICD-10-CM

## 2021-10-25 DIAGNOSIS — E11.59 OBESITY, DIABETES, AND HYPERTENSION SYNDROME: ICD-10-CM

## 2021-10-25 DIAGNOSIS — M54.31 SCIATICA OF RIGHT SIDE: Primary | ICD-10-CM

## 2021-10-25 DIAGNOSIS — F43.9 SITUATIONAL STRESS: ICD-10-CM

## 2021-10-25 DIAGNOSIS — I15.2 HYPERTENSION ASSOCIATED WITH DIABETES: ICD-10-CM

## 2021-10-25 DIAGNOSIS — E78.6 HIGH-DENSITY LIPOPROTEIN DEFICIENCY: ICD-10-CM

## 2021-10-25 DIAGNOSIS — E78.2 COMBINED HYPERLIPIDEMIA ASSOCIATED WITH TYPE 2 DIABETES MELLITUS: ICD-10-CM

## 2021-10-25 DIAGNOSIS — I48.21 PERMANENT ATRIAL FIBRILLATION: ICD-10-CM

## 2021-10-25 DIAGNOSIS — I15.2 OBESITY, DIABETES, AND HYPERTENSION SYNDROME: ICD-10-CM

## 2021-10-25 DIAGNOSIS — E11.69 OBESITY, DIABETES, AND HYPERTENSION SYNDROME: ICD-10-CM

## 2021-10-25 DIAGNOSIS — Z79.4 LONG-TERM INSULIN USE: ICD-10-CM

## 2021-10-25 PROCEDURE — 1126F PR PAIN SEVERITY QUANTIFIED, NO PAIN PRESENT: ICD-10-PCS | Mod: CPTII,S$GLB,, | Performed by: INTERNAL MEDICINE

## 2021-10-25 PROCEDURE — 1159F PR MEDICATION LIST DOCUMENTED IN MEDICAL RECORD: ICD-10-PCS | Mod: CPTII,S$GLB,, | Performed by: INTERNAL MEDICINE

## 2021-10-25 PROCEDURE — 3008F BODY MASS INDEX DOCD: CPT | Mod: CPTII,S$GLB,, | Performed by: INTERNAL MEDICINE

## 2021-10-25 PROCEDURE — 3051F PR MOST RECENT HEMOGLOBIN A1C LEVEL 7.0 - < 8.0%: ICD-10-PCS | Mod: CPTII,S$GLB,, | Performed by: INTERNAL MEDICINE

## 2021-10-25 PROCEDURE — 3288F FALL RISK ASSESSMENT DOCD: CPT | Mod: CPTII,S$GLB,, | Performed by: INTERNAL MEDICINE

## 2021-10-25 PROCEDURE — 3074F PR MOST RECENT SYSTOLIC BLOOD PRESSURE < 130 MM HG: ICD-10-PCS | Mod: CPTII,S$GLB,, | Performed by: INTERNAL MEDICINE

## 2021-10-25 PROCEDURE — 3078F DIAST BP <80 MM HG: CPT | Mod: CPTII,S$GLB,, | Performed by: INTERNAL MEDICINE

## 2021-10-25 PROCEDURE — 3078F PR MOST RECENT DIASTOLIC BLOOD PRESSURE < 80 MM HG: ICD-10-PCS | Mod: CPTII,S$GLB,, | Performed by: INTERNAL MEDICINE

## 2021-10-25 PROCEDURE — 3008F PR BODY MASS INDEX (BMI) DOCUMENTED: ICD-10-PCS | Mod: CPTII,S$GLB,, | Performed by: INTERNAL MEDICINE

## 2021-10-25 PROCEDURE — 1101F PR PT FALLS ASSESS DOC 0-1 FALLS W/OUT INJ PAST YR: ICD-10-PCS | Mod: CPTII,S$GLB,, | Performed by: INTERNAL MEDICINE

## 2021-10-25 PROCEDURE — 1101F PT FALLS ASSESS-DOCD LE1/YR: CPT | Mod: CPTII,S$GLB,, | Performed by: INTERNAL MEDICINE

## 2021-10-25 PROCEDURE — 3074F SYST BP LT 130 MM HG: CPT | Mod: CPTII,S$GLB,, | Performed by: INTERNAL MEDICINE

## 2021-10-25 PROCEDURE — 3288F PR FALLS RISK ASSESSMENT DOCUMENTED: ICD-10-PCS | Mod: CPTII,S$GLB,, | Performed by: INTERNAL MEDICINE

## 2021-10-25 PROCEDURE — 4010F ACE/ARB THERAPY RXD/TAKEN: CPT | Mod: CPTII,S$GLB,, | Performed by: INTERNAL MEDICINE

## 2021-10-25 PROCEDURE — 3051F HG A1C>EQUAL 7.0%<8.0%: CPT | Mod: CPTII,S$GLB,, | Performed by: INTERNAL MEDICINE

## 2021-10-25 PROCEDURE — 99999 PR PBB SHADOW E&M-EST. PATIENT-LVL V: ICD-10-PCS | Mod: PBBFAC,,, | Performed by: INTERNAL MEDICINE

## 2021-10-25 PROCEDURE — 1126F AMNT PAIN NOTED NONE PRSNT: CPT | Mod: CPTII,S$GLB,, | Performed by: INTERNAL MEDICINE

## 2021-10-25 PROCEDURE — 4010F PR ACE/ARB THEARPY RXD/TAKEN: ICD-10-PCS | Mod: CPTII,S$GLB,, | Performed by: INTERNAL MEDICINE

## 2021-10-25 PROCEDURE — 99214 PR OFFICE/OUTPT VISIT, EST, LEVL IV, 30-39 MIN: ICD-10-PCS | Mod: S$GLB,,, | Performed by: INTERNAL MEDICINE

## 2021-10-25 PROCEDURE — 99214 OFFICE O/P EST MOD 30 MIN: CPT | Mod: S$GLB,,, | Performed by: INTERNAL MEDICINE

## 2021-10-25 PROCEDURE — 99999 PR PBB SHADOW E&M-EST. PATIENT-LVL V: CPT | Mod: PBBFAC,,, | Performed by: INTERNAL MEDICINE

## 2021-10-25 PROCEDURE — 1159F MED LIST DOCD IN RCRD: CPT | Mod: CPTII,S$GLB,, | Performed by: INTERNAL MEDICINE

## 2021-10-25 RX ORDER — CLONAZEPAM 0.5 MG/1
0.5 TABLET ORAL NIGHTLY PRN
Qty: 30 TABLET | Refills: 5 | Status: SHIPPED | OUTPATIENT
Start: 2021-10-25

## 2021-10-25 RX ORDER — CLOBETASOL PROPIONATE 0.5 MG/G
CREAM TOPICAL 2 TIMES DAILY
Qty: 60 G | Refills: 4 | Status: SHIPPED | OUTPATIENT
Start: 2021-10-25

## 2021-10-25 RX ORDER — TRAMADOL HYDROCHLORIDE 50 MG/1
TABLET ORAL
Qty: 60 EACH | Refills: 5 | Status: SHIPPED | OUTPATIENT
Start: 2021-10-25 | End: 2022-04-05 | Stop reason: SDUPTHER

## 2021-10-31 ENCOUNTER — EXTERNAL CHRONIC CARE MANAGEMENT (OUTPATIENT)
Dept: PRIMARY CARE CLINIC | Facility: CLINIC | Age: 71
End: 2021-10-31
Payer: MEDICARE

## 2021-10-31 PROCEDURE — 99490 CHRNC CARE MGMT STAFF 1ST 20: CPT | Mod: S$GLB,,, | Performed by: INTERNAL MEDICINE

## 2021-10-31 PROCEDURE — 99490 PR CHRONIC CARE MGMT, 1ST 20 MIN: ICD-10-PCS | Mod: S$GLB,,, | Performed by: INTERNAL MEDICINE

## 2021-11-30 ENCOUNTER — EXTERNAL CHRONIC CARE MANAGEMENT (OUTPATIENT)
Dept: PRIMARY CARE CLINIC | Facility: CLINIC | Age: 71
End: 2021-11-30
Payer: MEDICARE

## 2021-11-30 PROCEDURE — 99490 PR CHRONIC CARE MGMT, 1ST 20 MIN: ICD-10-PCS | Mod: S$GLB,,, | Performed by: INTERNAL MEDICINE

## 2021-11-30 PROCEDURE — 99490 CHRNC CARE MGMT STAFF 1ST 20: CPT | Mod: S$GLB,,, | Performed by: INTERNAL MEDICINE

## 2021-12-31 ENCOUNTER — EXTERNAL CHRONIC CARE MANAGEMENT (OUTPATIENT)
Dept: PRIMARY CARE CLINIC | Facility: CLINIC | Age: 71
End: 2021-12-31
Payer: MEDICARE

## 2021-12-31 PROCEDURE — 99490 CHRNC CARE MGMT STAFF 1ST 20: CPT | Mod: S$GLB,,, | Performed by: INTERNAL MEDICINE

## 2021-12-31 PROCEDURE — 99490 PR CHRONIC CARE MGMT, 1ST 20 MIN: ICD-10-PCS | Mod: S$GLB,,, | Performed by: INTERNAL MEDICINE

## 2022-01-31 ENCOUNTER — EXTERNAL CHRONIC CARE MANAGEMENT (OUTPATIENT)
Dept: PRIMARY CARE CLINIC | Facility: CLINIC | Age: 72
End: 2022-01-31
Payer: MEDICARE

## 2022-01-31 PROCEDURE — 99490 PR CHRONIC CARE MGMT, 1ST 20 MIN: ICD-10-PCS | Mod: S$GLB,,, | Performed by: INTERNAL MEDICINE

## 2022-01-31 PROCEDURE — 99490 CHRNC CARE MGMT STAFF 1ST 20: CPT | Mod: S$GLB,,, | Performed by: INTERNAL MEDICINE

## 2022-04-05 ENCOUNTER — OFFICE VISIT (OUTPATIENT)
Dept: FAMILY MEDICINE | Facility: CLINIC | Age: 72
End: 2022-04-05
Payer: MEDICARE

## 2022-04-05 VITALS
BODY MASS INDEX: 44.37 KG/M2 | HEART RATE: 75 BPM | SYSTOLIC BLOOD PRESSURE: 132 MMHG | HEIGHT: 60 IN | OXYGEN SATURATION: 98 % | DIASTOLIC BLOOD PRESSURE: 66 MMHG | WEIGHT: 226 LBS | TEMPERATURE: 98 F

## 2022-04-05 DIAGNOSIS — E55.9 VITAMIN D DEFICIENCY DISEASE: ICD-10-CM

## 2022-04-05 DIAGNOSIS — E11.65 UNCONTROLLED TYPE 2 DIABETES MELLITUS WITH HYPERGLYCEMIA: ICD-10-CM

## 2022-04-05 DIAGNOSIS — Z86.73 HISTORY OF STROKE: ICD-10-CM

## 2022-04-05 DIAGNOSIS — I48.21 PERMANENT ATRIAL FIBRILLATION: ICD-10-CM

## 2022-04-05 DIAGNOSIS — G47.33 OSA (OBSTRUCTIVE SLEEP APNEA): ICD-10-CM

## 2022-04-05 DIAGNOSIS — Z79.4 LONG-TERM INSULIN USE: ICD-10-CM

## 2022-04-05 DIAGNOSIS — Z00.00 ROUTINE MEDICAL EXAM: Primary | ICD-10-CM

## 2022-04-05 DIAGNOSIS — Z86.010 HISTORY OF COLONIC POLYPS: ICD-10-CM

## 2022-04-05 DIAGNOSIS — E11.59 HYPERTENSION ASSOCIATED WITH DIABETES: ICD-10-CM

## 2022-04-05 DIAGNOSIS — E21.3 HYPERPARATHYROIDISM: ICD-10-CM

## 2022-04-05 DIAGNOSIS — E78.2 COMBINED HYPERLIPIDEMIA ASSOCIATED WITH TYPE 2 DIABETES MELLITUS: ICD-10-CM

## 2022-04-05 DIAGNOSIS — Z12.31 ENCOUNTER FOR SCREENING MAMMOGRAM FOR BREAST CANCER: ICD-10-CM

## 2022-04-05 DIAGNOSIS — N28.9 RENAL INSUFFICIENCY: ICD-10-CM

## 2022-04-05 DIAGNOSIS — G47.00 INSOMNIA, UNSPECIFIED TYPE: ICD-10-CM

## 2022-04-05 DIAGNOSIS — I15.2 HYPERTENSION ASSOCIATED WITH DIABETES: ICD-10-CM

## 2022-04-05 DIAGNOSIS — E78.6 HIGH-DENSITY LIPOPROTEIN DEFICIENCY: ICD-10-CM

## 2022-04-05 DIAGNOSIS — E66.01 MORBID OBESITY: ICD-10-CM

## 2022-04-05 DIAGNOSIS — E11.69 COMBINED HYPERLIPIDEMIA ASSOCIATED WITH TYPE 2 DIABETES MELLITUS: ICD-10-CM

## 2022-04-05 PROCEDURE — 4010F ACE/ARB THERAPY RXD/TAKEN: CPT | Mod: CPTII,S$GLB,, | Performed by: INTERNAL MEDICINE

## 2022-04-05 PROCEDURE — 1101F PT FALLS ASSESS-DOCD LE1/YR: CPT | Mod: CPTII,S$GLB,, | Performed by: INTERNAL MEDICINE

## 2022-04-05 PROCEDURE — 3078F PR MOST RECENT DIASTOLIC BLOOD PRESSURE < 80 MM HG: ICD-10-PCS | Mod: CPTII,S$GLB,, | Performed by: INTERNAL MEDICINE

## 2022-04-05 PROCEDURE — 3051F HG A1C>EQUAL 7.0%<8.0%: CPT | Mod: CPTII,S$GLB,, | Performed by: INTERNAL MEDICINE

## 2022-04-05 PROCEDURE — 99397 PER PM REEVAL EST PAT 65+ YR: CPT | Mod: GZ,S$GLB,, | Performed by: INTERNAL MEDICINE

## 2022-04-05 PROCEDURE — 4010F PR ACE/ARB THEARPY RXD/TAKEN: ICD-10-PCS | Mod: CPTII,S$GLB,, | Performed by: INTERNAL MEDICINE

## 2022-04-05 PROCEDURE — 3288F FALL RISK ASSESSMENT DOCD: CPT | Mod: CPTII,S$GLB,, | Performed by: INTERNAL MEDICINE

## 2022-04-05 PROCEDURE — 3008F BODY MASS INDEX DOCD: CPT | Mod: CPTII,S$GLB,, | Performed by: INTERNAL MEDICINE

## 2022-04-05 PROCEDURE — 3288F PR FALLS RISK ASSESSMENT DOCUMENTED: ICD-10-PCS | Mod: CPTII,S$GLB,, | Performed by: INTERNAL MEDICINE

## 2022-04-05 PROCEDURE — 3008F PR BODY MASS INDEX (BMI) DOCUMENTED: ICD-10-PCS | Mod: CPTII,S$GLB,, | Performed by: INTERNAL MEDICINE

## 2022-04-05 PROCEDURE — 1159F MED LIST DOCD IN RCRD: CPT | Mod: CPTII,S$GLB,, | Performed by: INTERNAL MEDICINE

## 2022-04-05 PROCEDURE — 3051F PR MOST RECENT HEMOGLOBIN A1C LEVEL 7.0 - < 8.0%: ICD-10-PCS | Mod: CPTII,S$GLB,, | Performed by: INTERNAL MEDICINE

## 2022-04-05 PROCEDURE — 1159F PR MEDICATION LIST DOCUMENTED IN MEDICAL RECORD: ICD-10-PCS | Mod: CPTII,S$GLB,, | Performed by: INTERNAL MEDICINE

## 2022-04-05 PROCEDURE — 3078F DIAST BP <80 MM HG: CPT | Mod: CPTII,S$GLB,, | Performed by: INTERNAL MEDICINE

## 2022-04-05 PROCEDURE — 99999 PR PBB SHADOW E&M-EST. PATIENT-LVL IV: ICD-10-PCS | Mod: PBBFAC,,, | Performed by: INTERNAL MEDICINE

## 2022-04-05 PROCEDURE — 99397 PR PREVENTIVE VISIT,EST,65 & OVER: ICD-10-PCS | Mod: GZ,S$GLB,, | Performed by: INTERNAL MEDICINE

## 2022-04-05 PROCEDURE — 1101F PR PT FALLS ASSESS DOC 0-1 FALLS W/OUT INJ PAST YR: ICD-10-PCS | Mod: CPTII,S$GLB,, | Performed by: INTERNAL MEDICINE

## 2022-04-05 PROCEDURE — 3075F SYST BP GE 130 - 139MM HG: CPT | Mod: CPTII,S$GLB,, | Performed by: INTERNAL MEDICINE

## 2022-04-05 PROCEDURE — 99999 PR PBB SHADOW E&M-EST. PATIENT-LVL IV: CPT | Mod: PBBFAC,,, | Performed by: INTERNAL MEDICINE

## 2022-04-05 PROCEDURE — 3075F PR MOST RECENT SYSTOLIC BLOOD PRESS GE 130-139MM HG: ICD-10-PCS | Mod: CPTII,S$GLB,, | Performed by: INTERNAL MEDICINE

## 2022-04-05 RX ORDER — TRAMADOL HYDROCHLORIDE 50 MG/1
50 TABLET ORAL 3 TIMES DAILY
Qty: 270 EACH | Refills: 5 | Status: SHIPPED | OUTPATIENT
Start: 2022-04-05

## 2022-04-05 RX ORDER — TEMAZEPAM 15 MG/1
15 CAPSULE ORAL NIGHTLY PRN
Qty: 90 CAPSULE | Refills: 4 | Status: SHIPPED | OUTPATIENT
Start: 2022-04-05 | End: 2022-05-05

## 2022-04-05 NOTE — PROGRESS NOTES
Chief complaint:     72-year-old white female .      Patient does have severe arthritis on the left shoulder.  It is better if she actually sleeps on that left shoulder.      She does get cortisone shots in that shoulder and does raise her sugars.  She is followed by outside Endocrine.  She watches her diet well when she gets steroid shots.      She was on temazepam     She is on anticoagulations were obviously can't take anti-inflammatories.  We discussed the tramadol is probably the safest medication to take.  She may use one or two per day and discuss that is perfectly fine.  She has tried gabapentin in the past because her mental slowing as expected.      She does occasionally gets some low back pain and knee pain.  She took tramadol and that is been very effective.  She would get a small supply and still had a few leftover.  She expects to get some more knee pain and back pain [place] and so forth.  I will be happy to give her some tramadol.        She is followed by local Endocrine and she had labs .  7.9 in October, now 8 . .  Her A1c did improve down to 6.5 her so but then she said it was back up into 7.9 1/21.  She is on Januvia 50 mg now instead of the previous medicine.  Her PTH level was better.  Her vitamin-D was better.  Her local cardiologist said the persistent slight hypercalcemia may well be due to the hydrochlorothiazide.  She does have A1c scheduled for April with Endocrine.    Mammo 6/21    [facility] GI, 3 polyps 2/19 --3 yrs saw Terry    She did have a left knee replacement in September 2018 is now walking without a cane since November has trying to walk 1 mi per day      AFib- Dr Schwarz, she saw cardiology     ROS:   CONST: weight stable. EYES: no vision change. ENT: no sore throat. CV: no chest pain w/ exertion. RESP: no shortness of breath. GI: no nausea, vomiting, diarrhea. No dysphagia. : no urinary issues.  MUSCULOSKELETAL: no other new myalgias or arthralgias. SKIN: no new changes. NEURO: no focal deficits. PSYCH: no new issues. ENDOCRINE: no polyuria. HEME: no lymph nodes. ALLERGY: no general pruritis.    PAST MEDICAL HISTORY:  1. Hypertension  2. Diabetes Mellitus Type II, follows with Dr. Martínez - Endocrinology  3. History of Post Streptococcal Glomerulonephritis  4. Chronic LE Edema  5. Sleep Apnea: 1st sleep study at [facility], 2nd at [facility], 3rd at [place]  6. Obesity  7. Hyperlipidemia  8. Postmenopausal  9. Colonoscopy 2003, Dr. Stewart - [facility], 3 polyps  --3 yrs  10.  MNGoiter  11. Renal insufficiency  12. Hyperparathyroidism/hypercalcemia  13. Vit D def  14. Low HDL  15. STROKE with speech deficit  at .  Now on Plavix.   16.  Right knee replacement , left knee replacement   17.  Urinary incontinence  18. AFib- Dr Schwarz    PAST SURGICAL HISTORY:   1. Hysterectomy  at [place] secondary to fibroids  2. Partial Thyroidectomy 2005: 80% of Left and All of Right removed  3. Sleep Apnea surgery  4. Appendectomy  5. Tonsillectomy    SOCIAL HISTORY: No tobacco.   . Works at [employer].    FAMILY HISTORY:   1. Mom  from Breast Cancer  2. Dad  from Stomach Cancer secondary to Asbestos    Gen: no distress  EYES: conjunctiva clear, non-icteric, PERRL  ENT: nose clear, nasal mucosa normal, oropharynx clear and moist, teeth good  NECK:supple, thyroid non-palpable  RESP: effort is good, lungs clear  CV: heart RRR w/o murmur, gallops or rubs; no carotid bruits, no edema  GI: abdomen soft, non-distended, non-tender, no hepatosplenomegaly  MS: gait normal, no clubbing or cyanosis of the digits  SKIN: no rashes, warm to touch    Labs reviewed    Andrade was seen today for annual exam.    Diagnoses and all orders for this visit:    Routine medical exam    Encounter for screening mammogram for breast cancer  -June    History of colonic polyps, f/u w GI    Uncontrolled type 2 diabetes mellitus with stage 3 chronic kidney disease, with long-term current use of insulin, Endo    Uncontrolled type 2 diabetes mellitus with hyperglycemia    Hypertension associated with diabetes    Combined hyperlipidemia associated with type 2 diabetes mellitus    Vitamin D deficiency disease    MILLIE (obstructive sleep apnea)    Diabetes mellitus with renal manifestations, uncontrolled    Renal insufficiency    High-density lipoprotein deficiency    Long-term insulin use    Hyperparathyroidism    Insomnia, unspecified type, restoril given    History of stroke    Morbid obesity    Permanent atrial fibrillation- per Cards       Chronic pain - tramadol

## 2022-05-05 LAB — CRC RECOMMENDATION EXT: NORMAL

## 2022-05-09 ENCOUNTER — PATIENT OUTREACH (OUTPATIENT)
Dept: ADMINISTRATIVE | Facility: HOSPITAL | Age: 72
End: 2022-05-09
Payer: MEDICARE

## 2022-05-09 ENCOUNTER — PATIENT MESSAGE (OUTPATIENT)
Dept: ADMINISTRATIVE | Facility: HOSPITAL | Age: 72
End: 2022-05-09
Payer: MEDICARE

## 2022-05-21 ENCOUNTER — NURSE TRIAGE (OUTPATIENT)
Dept: ADMINISTRATIVE | Facility: CLINIC | Age: 72
End: 2022-05-21
Payer: MEDICARE

## 2022-05-21 NOTE — TELEPHONE ENCOUNTER
OOC outgoing call - Pt c/o redness bilat eyes with yellow/green discharge. Eye redness protocol followed and pt advised to see her dr within 2 weeks. OAC offered and Pt shows understanding. Encounter routed to PCP as high priority. Pt asking for rx eye drops as possible treatment. Education given to Pt. Recommended to Pt to flush eyes with NS solution otc. Instructed Pt to call ooc at 1 [phone] if having worsening symptoms or questions and to follow up with OAC.      Reason for Disposition   Redness of white of eye (sclera), but no pus or only a small amount of brief pus   Caused by pollen or other allergy OR main symptom is itchy eyes   Runny nose, nose itching, and sneezing also present   [1] Nasal allergies AND [2] only certain times of year AND [3] hay fever diagnosis has never been confirmed by a HCP    Additional Information   Negative: Chemical got in the eye   Negative: Piece of something got in the eye   Negative: Eye redness followed an eye injury   Negative: Eye exposure to chemical or fumes   Negative: Yellow or green pus in the eyes   Negative: [1] Eyelid is swollen AND [2] no redness of white of eye (sclera)   Negative: Chemical gets into the eye from fingers, contaminated object, spray, or splash   Negative: Eye pain   Negative: [1] Wheezing (high pitched whistling sound) AND [2] previous asthma attacks or use of asthma medicines   Negative: [1] Wheezing (high pitched whistling sound) AND [2] no history of asthma   Negative: Eye redness and itching are the only symptoms   Negative: Doesn't match the SYMPTOMS for Hay Fever   Negative: Patient sounds very sick or weak to the triager   Negative: Lots of coughing   Negative: [1] Lots of yellow or green discharge from nose AND [2] present > 3 days   Negative: [1] Taking antihistamines > 2 days AND [2] nasal allergy symptoms interfere with sleep, school, or work    Protocols used: EYE - PUS OR [id number], EYE - RED WITHOUT  PUS-A-AH, EYE - ALLERGY-A-AH, NASAL ALLERGIES (HAY FEVER)-A-AH

## 2022-05-23 ENCOUNTER — TELEPHONE (OUTPATIENT)
Dept: FAMILY MEDICINE | Facility: CLINIC | Age: 72
End: 2022-05-23
Payer: MEDICARE

## 2022-06-07 ENCOUNTER — PATIENT OUTREACH (OUTPATIENT)
Dept: ADMINISTRATIVE | Facility: HOSPITAL | Age: 72
End: 2022-06-07
Payer: MEDICARE

## 2022-06-07 DIAGNOSIS — E11.9 TYPE 2 DIABETES MELLITUS WITHOUT COMPLICATION, WITHOUT LONG-TERM CURRENT USE OF INSULIN: Primary | ICD-10-CM

## 2022-07-11 ENCOUNTER — PATIENT OUTREACH (OUTPATIENT)
Dept: ADMINISTRATIVE | Facility: HOSPITAL | Age: 72
End: 2022-07-11
Payer: MEDICARE

## 2023-01-09 ENCOUNTER — PATIENT MESSAGE (OUTPATIENT)
Dept: ADMINISTRATIVE | Facility: HOSPITAL | Age: 73
End: 2023-01-09
Payer: MEDICARE

## 2023-02-13 LAB
LEFT EYE DM RETINOPATHY: NEGATIVE
RIGHT EYE DM RETINOPATHY: NEGATIVE

## 2023-02-20 ENCOUNTER — PATIENT OUTREACH (OUTPATIENT)
Dept: ADMINISTRATIVE | Facility: HOSPITAL | Age: 73
End: 2023-02-20
Payer: MEDICARE

## 2023-03-24 ENCOUNTER — PATIENT MESSAGE (OUTPATIENT)
Dept: ADMINISTRATIVE | Facility: HOSPITAL | Age: 73
End: 2023-03-24
Payer: MEDICARE

## 2023-03-24 ENCOUNTER — PATIENT OUTREACH (OUTPATIENT)
Dept: ADMINISTRATIVE | Facility: HOSPITAL | Age: 73
End: 2023-03-24
Payer: MEDICARE

## 2023-04-27 ENCOUNTER — OFFICE VISIT (OUTPATIENT)
Dept: FAMILY MEDICINE | Facility: CLINIC | Age: 73
End: 2023-04-27
Payer: MEDICARE

## 2023-04-27 VITALS
OXYGEN SATURATION: 95 % | SYSTOLIC BLOOD PRESSURE: 124 MMHG | DIASTOLIC BLOOD PRESSURE: 82 MMHG | HEART RATE: 58 BPM | WEIGHT: 228.38 LBS | BODY MASS INDEX: 44.84 KG/M2 | TEMPERATURE: 98 F | HEIGHT: 60 IN

## 2023-04-27 DIAGNOSIS — Z00.00 ROUTINE MEDICAL EXAM: Primary | ICD-10-CM

## 2023-04-27 DIAGNOSIS — Z86.010 HISTORY OF COLONIC POLYPS: ICD-10-CM

## 2023-04-27 DIAGNOSIS — N18.32 STAGE 3B CHRONIC KIDNEY DISEASE: ICD-10-CM

## 2023-04-27 DIAGNOSIS — Z79.4 LONG-TERM INSULIN USE: ICD-10-CM

## 2023-04-27 DIAGNOSIS — E78.6 HIGH-DENSITY LIPOPROTEIN DEFICIENCY: ICD-10-CM

## 2023-04-27 DIAGNOSIS — E78.2 COMBINED HYPERLIPIDEMIA ASSOCIATED WITH TYPE 2 DIABETES MELLITUS: ICD-10-CM

## 2023-04-27 DIAGNOSIS — E11.69 COMBINED HYPERLIPIDEMIA ASSOCIATED WITH TYPE 2 DIABETES MELLITUS: ICD-10-CM

## 2023-04-27 DIAGNOSIS — E66.01 MORBID OBESITY: ICD-10-CM

## 2023-04-27 DIAGNOSIS — G47.33 OSA (OBSTRUCTIVE SLEEP APNEA): ICD-10-CM

## 2023-04-27 DIAGNOSIS — M46.86 OTHER SPECIFIED INFLAMMATORY SPONDYLOPATHIES, LUMBAR REGION: ICD-10-CM

## 2023-04-27 DIAGNOSIS — E11.65 UNCONTROLLED TYPE 2 DIABETES MELLITUS WITH HYPERGLYCEMIA: ICD-10-CM

## 2023-04-27 DIAGNOSIS — E11.59 HYPERTENSION ASSOCIATED WITH DIABETES: ICD-10-CM

## 2023-04-27 DIAGNOSIS — E21.3 HYPERPARATHYROIDISM: ICD-10-CM

## 2023-04-27 DIAGNOSIS — G47.00 INSOMNIA, UNSPECIFIED TYPE: ICD-10-CM

## 2023-04-27 DIAGNOSIS — Z86.73 HISTORY OF STROKE: ICD-10-CM

## 2023-04-27 DIAGNOSIS — Z12.31 ENCOUNTER FOR SCREENING MAMMOGRAM FOR BREAST CANCER: ICD-10-CM

## 2023-04-27 DIAGNOSIS — N28.9 RENAL INSUFFICIENCY: ICD-10-CM

## 2023-04-27 DIAGNOSIS — I15.2 HYPERTENSION ASSOCIATED WITH DIABETES: ICD-10-CM

## 2023-04-27 DIAGNOSIS — E55.9 VITAMIN D DEFICIENCY DISEASE: ICD-10-CM

## 2023-04-27 DIAGNOSIS — I48.21 PERMANENT ATRIAL FIBRILLATION: ICD-10-CM

## 2023-04-27 PROCEDURE — 4010F ACE/ARB THERAPY RXD/TAKEN: CPT | Mod: CPTII,S$GLB,, | Performed by: INTERNAL MEDICINE

## 2023-04-27 PROCEDURE — 1101F PR PT FALLS ASSESS DOC 0-1 FALLS W/OUT INJ PAST YR: ICD-10-PCS | Mod: CPTII,S$GLB,, | Performed by: INTERNAL MEDICINE

## 2023-04-27 PROCEDURE — 1101F PT FALLS ASSESS-DOCD LE1/YR: CPT | Mod: CPTII,S$GLB,, | Performed by: INTERNAL MEDICINE

## 2023-04-27 PROCEDURE — 3079F PR MOST RECENT DIASTOLIC BLOOD PRESSURE 80-89 MM HG: ICD-10-PCS | Mod: CPTII,S$GLB,, | Performed by: INTERNAL MEDICINE

## 2023-04-27 PROCEDURE — 1159F MED LIST DOCD IN RCRD: CPT | Mod: CPTII,S$GLB,, | Performed by: INTERNAL MEDICINE

## 2023-04-27 PROCEDURE — 3074F PR MOST RECENT SYSTOLIC BLOOD PRESSURE < 130 MM HG: ICD-10-PCS | Mod: CPTII,S$GLB,, | Performed by: INTERNAL MEDICINE

## 2023-04-27 PROCEDURE — 1126F AMNT PAIN NOTED NONE PRSNT: CPT | Mod: CPTII,S$GLB,, | Performed by: INTERNAL MEDICINE

## 2023-04-27 PROCEDURE — 3288F FALL RISK ASSESSMENT DOCD: CPT | Mod: CPTII,S$GLB,, | Performed by: INTERNAL MEDICINE

## 2023-04-27 PROCEDURE — 1126F PR PAIN SEVERITY QUANTIFIED, NO PAIN PRESENT: ICD-10-PCS | Mod: CPTII,S$GLB,, | Performed by: INTERNAL MEDICINE

## 2023-04-27 PROCEDURE — 4010F PR ACE/ARB THEARPY RXD/TAKEN: ICD-10-PCS | Mod: CPTII,S$GLB,, | Performed by: INTERNAL MEDICINE

## 2023-04-27 PROCEDURE — 99397 PR PREVENTIVE VISIT,EST,65 & OVER: ICD-10-PCS | Mod: S$GLB,,, | Performed by: INTERNAL MEDICINE

## 2023-04-27 PROCEDURE — 3079F DIAST BP 80-89 MM HG: CPT | Mod: CPTII,S$GLB,, | Performed by: INTERNAL MEDICINE

## 2023-04-27 PROCEDURE — 3288F PR FALLS RISK ASSESSMENT DOCUMENTED: ICD-10-PCS | Mod: CPTII,S$GLB,, | Performed by: INTERNAL MEDICINE

## 2023-04-27 PROCEDURE — 99999 PR PBB SHADOW E&M-EST. PATIENT-LVL IV: CPT | Mod: PBBFAC,,, | Performed by: INTERNAL MEDICINE

## 2023-04-27 PROCEDURE — 1159F PR MEDICATION LIST DOCUMENTED IN MEDICAL RECORD: ICD-10-PCS | Mod: CPTII,S$GLB,, | Performed by: INTERNAL MEDICINE

## 2023-04-27 PROCEDURE — 99999 PR PBB SHADOW E&M-EST. PATIENT-LVL IV: ICD-10-PCS | Mod: PBBFAC,,, | Performed by: INTERNAL MEDICINE

## 2023-04-27 PROCEDURE — 99397 PER PM REEVAL EST PAT 65+ YR: CPT | Mod: S$GLB,,, | Performed by: INTERNAL MEDICINE

## 2023-04-27 PROCEDURE — 3074F SYST BP LT 130 MM HG: CPT | Mod: CPTII,S$GLB,, | Performed by: INTERNAL MEDICINE

## 2023-04-27 RX ORDER — FUROSEMIDE 20 MG/1
20 TABLET ORAL
COMMUNITY
Start: 2023-04-17

## 2023-04-27 RX ORDER — ATORVASTATIN CALCIUM 40 MG/1
1 TABLET, FILM COATED ORAL DAILY
COMMUNITY
Start: 2023-02-28

## 2023-04-27 RX ORDER — INSULIN GLARGINE 300 U/ML
16 INJECTION, SOLUTION SUBCUTANEOUS
COMMUNITY
Start: 2022-10-26 | End: 2024-01-19 | Stop reason: SDUPTHER

## 2023-04-27 RX ORDER — TEMAZEPAM 15 MG/1
15 CAPSULE ORAL NIGHTLY PRN
Qty: 30 CAPSULE | Refills: 5 | Status: SHIPPED | OUTPATIENT
Start: 2023-04-27 | End: 2023-05-27

## 2023-04-27 RX ORDER — DILTIAZEM HYDROCHLORIDE 240 MG/1
1 CAPSULE, EXTENDED RELEASE ORAL DAILY
COMMUNITY
Start: 2023-02-24

## 2023-04-27 RX ORDER — GLIPIZIDE 5 MG/1
5 TABLET ORAL
COMMUNITY
Start: 2023-01-30

## 2023-04-27 RX ORDER — SOLIFENACIN SUCCINATE 10 MG/1
10 TABLET, FILM COATED ORAL
COMMUNITY
Start: 2023-04-25

## 2023-04-27 RX ORDER — PEN NEEDLE, DIABETIC 32 GX 1/6"
1 NEEDLE, DISPOSABLE MISCELLANEOUS
COMMUNITY
Start: 2023-04-17

## 2023-04-27 RX ORDER — INSULIN PUMP SYRINGE, 3 ML
EACH MISCELLANEOUS
COMMUNITY
Start: 2022-07-05

## 2023-04-27 RX ORDER — CEFDINIR 300 MG/1
300 CAPSULE ORAL 2 TIMES DAILY
COMMUNITY
Start: 2023-03-01

## 2023-04-27 RX ORDER — TELMISARTAN 80 MG/1
1 TABLET ORAL DAILY
COMMUNITY
Start: 2023-02-24

## 2023-04-27 NOTE — PROGRESS NOTES
Chief complaint: physical    73-year-old white female .   Mammo 7/22  [facility], 3 polyps 2/19 --3 yrs saw Terry, scope 5/22 w 1 year rec  ??3 polyps and one was 15mm           Patient does have severe arthritis on the left shoulder and had it replaced- much better.       She is on temazepam - refiled    She is on anticoagulations were obviously can't take anti-inflammatories.          She is followed by local Endocrine and she had labs .  7.9 in 10/22 .  Her A1c did improve down to 6.5 her so but then she said it was back up into 7.9 1/21, last 6.6.  She is on Januvia 50 mg now instead of the previous medicine.  Her PTH level was better.  Her vitamin-D was better.  Her local cardiologist said the persistent slight hypercalcemia may well be due to the hydrochlorothiazide.  She does have A1c scheduled  with Endocrine.      She did have a left knee replacement in September 2018 is now walking without a cane since November has trying to walk 1 mi per day      AFib- Dr Schwarz, she saw cardiology     ROS:   CONST: weight stable. EYES: no vision change. ENT: no sore throat. CV: no chest pain w/ exertion. RESP: no shortness of breath. GI: no nausea, vomiting, diarrhea. No dysphagia. : no urinary issues. MUSCULOSKELETAL: no other new myalgias or arthralgias. SKIN: no new changes. NEURO: no focal deficits. PSYCH: no new issues. ENDOCRINE: no polyuria. HEME: no lymph nodes. ALLERGY: no general pruritis.    PAST MEDICAL HISTORY:  1. Hypertension  2. Diabetes Mellitus Type II, follows with Dr. Martínez - Endocrinology  3. History of Post Streptococcal Glomerulonephritis  4. Chronic LE Edema  5. Sleep Apnea: 1st sleep study at [facility], 2nd at [facility], 3rd at [place]  6. Obesity  7. Hyperlipidemia  8. Postmenopausal  9. Colonoscopy 12/2003, Dr. Stewart - [facility] [place], operated by [facility], 3 polyps 2/19 --3 yrs, scope 5/22 w 1 year rec  10.  MNGoiter  11. Renal insufficiency  12. Hyperparathyroidism/hypercalcemia  13. Vit D  def  14. Low HDL  15. STROKE with speech deficit  at .  Now on Plavix.   16.  Right knee replacement 2016, left knee replacement   17.  Urinary incontinence  18. AFib- Dr Schwarz    PAST SURGICAL HISTORY:   1. Hysterectomy  at [place] secondary to fibroids  2. Partial Thyroidectomy 2005: 80% of Left and All of Right removed  3. Sleep Apnea surgery  4. Appendectomy  5. Tonsillectomy    SOCIAL HISTORY: No tobacco.   . Works at [email or web address].    FAMILY HISTORY:   1. Mom  from Breast Cancer  2. Dad  from Stomach Cancer secondary to Asbestos    Gen: no distress  EYES: conjunctiva clear, non-icteric, PERRL  ENT: nose clear, nasal mucosa normal, oropharynx clear and moist, teeth good  NECK:supple, thyroid non-palpable  RESP: effort is good, lungs clear  CV: heart RRR w/o murmur, gallops or rubs; no carotid bruits, no edema  GI: abdomen soft, non-distended, non-tender, no hepatosplenomegaly  MS: gait normal, no clubbing or cyanosis of the digits  SKIN: no rashes, warm to touch    Labs reviewed    SDiagnoses and all orders for this visit:    Routine medical exam    Encounter for screening mammogram for breast cancer    History of colonic polyps    Uncontrolled type 2 diabetes mellitus with hyperglycemia    Hypertension associated with diabetes    Combined hyperlipidemia associated with type 2 diabetes mellitus    Vitamin D deficiency disease    MILLIE (obstructive sleep apnea) faithfully uses [facility] since , needs all supplies     Renal insufficiency    High-density lipoprotein deficiency    Morbid obesity    History of stroke    Insomnia, unspecified type    Hyperparathyroidism    Long-term insulin use    Permanent atrial fibrillation    Other specified inflammatory spondylopathies, lumbar region    Stage 3b chronic kidney disease

## 2023-06-06 NOTE — TELEPHONE ENCOUNTER
HPI   Patient is a 67-year-old male presenting with concern for worsened pain and possible infection at his surgical site.  On 6/1 the patient accidentally put his foot into a lawn more.  There was large lacerations involving his right first and second toes.  He was treated by Dr. Leahy at [facility], see their notes for details.  There was a partial amputation of the first and second toes.  He was placed on Keflex and given oxycodone.  Yesterday evening at about 10:00 PM he had increased pain.  He had taken 2 oxycodone at 4:00 PM and then took another 1 before bed at 10:00 PM.  He woke up at 3:00 AM with pain and took another 1 tablet of oxycodone.  His pain continues here.  He has been taking the Keflex as directed.  His wife changed the dressing on Sunday, 2 days ago.  She tells me that the redness and swelling involving the great toe is new.  No fever.  No other systemic symptoms.        Allergies:  Allergies   Allergen Reactions     Lisinopril Cough     Problem List:    There are no problems to display for this patient.     Past Medical History:    No past medical history on file.  Past Surgical History:    No past surgical history on file.  Family History:    No family history on file.  Social History:  Marital Status:   [2]  Social History     Tobacco Use     Smoking status: Every Day     Packs/day: 1.00     Types: Cigarettes     Smokeless tobacco: Never   Substance Use Topics     Alcohol use: Not Currently     Drug use: Never      Medications:    cephALEXin (KEFLEX) 500 MG capsule  oxyCODONE (ROXICODONE) 5 MG tablet  sulfamethoxazole-trimethoprim (BACTRIM DS) 800-160 MG tablet  ASPIRIN 325 MG OR TBEC  BYSTOLIC 10 MG OR TABS  DIOVAN 80 MG OR CAPS  FLOMAX CPCR 0.4 MG OR  NORVASC 10 MG OR TABS  PLAVIX 75 MG OR TABS      Review of Systems   All other systems reviewed and are negative.      PE   BP: (!) 169/76  Resp: 18  Weight: 80.7 kg (178 lb)  SpO2: 96 %  Physical Exam  Vitals and nursing note  Please let the patient know her x-ray shows arthritis in the shoulder, no other abnormality. Keep follow-up with orthopedics.    Thanks!  MCKENNA Singh     reviewed.   Constitutional:       General: He is not in acute distress.  HENT:      Head: Atraumatic.      Right Ear: External ear normal.      Left Ear: External ear normal.      Nose: Nose normal.      Mouth/Throat:      Mouth: Mucous membranes are moist.      Pharynx: Oropharynx is clear.   Eyes:      General: No scleral icterus.     Extraocular Movements: Extraocular movements intact.      Conjunctiva/sclera: Conjunctivae normal.      Pupils: Pupils are equal, round, and reactive to light.   Cardiovascular:      Rate and Rhythm: Normal rate.   Pulmonary:      Effort: Pulmonary effort is normal. No respiratory distress.   Musculoskeletal:         General: Normal range of motion.      Cervical back: Normal range of motion.   Skin:     General: Skin is warm and dry.      Comments: See photo.  No evidence of purulent drainage.  There is a small amount of skin breakdown along the suture line at the tip of the amputated first toe.  There is redness and swelling and tenderness.   Neurological:      Mental Status: He is alert and oriented to person, place, and time.   Psychiatric:         Behavior: Behavior normal.                     ED COURSE and MDM   0657.  Patient has symptoms and signs as described above.  Taking Keflex.  Increased pain and swelling.  Local redness and tenderness.  I will provide oxycodone here for pain control and then reach out to Dr. Leahy and their team.      0839.  I spoke with orthopedics at [facility].  X-ray of the right foot will be obtained.  If there is evidence of osteomyelitis I will send the patient to them for further evaluation.  If the x-ray does not show osteomyelitis I will increase Keflex to 4 times a day and add Bactrim twice a day and then have him follow-up as scheduled in 2 days.    Electronic medical chart reviewed, including medical problems, medications, medical allergies, social history.  Recent hospitalizations and surgical procedures reviewed.  Recent clinic  visits and consultations reviewed.  Recent labs and test results reviewed.  Nursing notes reviewed.    The patient, their parent if applicable, and/or their medical decision maker(s) and I have reviewed all of the available historical information, applicable PMH, physical exam findings, and objective diagnostic data gathered during this ED visit.  We then discussed all work-up options and then together agreed upon the course taken during this visit.  The ultimate disposition and plan was a cooperative decision made between myself and the patient, their parent if applicable, and/or their legal decision maker(s).  The risks and benefits of all decisions made during this visit were discussed to the best of my abilities given the circumstances, and all parties are understanding of the pertinent ramifications of these decisions.      LABS  Labs Ordered and Resulted from Time of ED Arrival to Time of ED Departure - No data to display    IMAGING  Images reviewed by me.  Radiology report also reviewed.  Foot  XR, G/E 3 views, right   Final Result   IMPRESSION: Amputation of the first toe at the level of the proximal   phalanx/IP joint. Partial amputation of the second toe. There are   small bone fragments in the soft tissues of the resection   margins/amputation sites. There is soft tissue swelling at both sites.   No definitive evidence of osteomyelitis along either resection margin.      Transverse fracture of the distal phalanx of the fifth toe shows no   bony bridging. Remote healed fracture deformity of the proximal   phalanx of the fifth toe.      ROLAND HINDS MD            SYSTEM ID:  [id number]          Procedures    Medications   oxyCODONE (ROXICODONE) tablet 10 mg (10 mg Oral $Given 6/6/23 0846)         IMPRESSION       ICD-10-CM    1. Cellulitis of toe of right foot  L03.031                Medication List      Started    cephALEXin 500 MG capsule  Commonly known as: KEFLEX  500 mg, Oral, 4 TIMES DAILY      sulfamethoxazole-trimethoprim 800-160 MG tablet  Commonly known as: Bactrim DS  1 tablet, Oral, 2 TIMES DAILY        Modified    oxyCODONE 5 MG tablet  Commonly known as: ROXICODONE  5-10 mg, Oral, EVERY 8 HOURS PRN  What changed:     how much to take    when to take this    reasons to take this                        Remigio Schaefer MD  06/06/23 7834

## 2023-06-07 ENCOUNTER — PATIENT OUTREACH (OUTPATIENT)
Dept: ADMINISTRATIVE | Facility: HOSPITAL | Age: 73
End: 2023-06-07
Payer: MEDICARE

## 2023-06-14 DIAGNOSIS — N18.32 STAGE 3B CHRONIC KIDNEY DISEASE: ICD-10-CM

## 2023-07-12 DIAGNOSIS — N18.32 STAGE 3B CHRONIC KIDNEY DISEASE: ICD-10-CM

## 2023-07-24 ENCOUNTER — TELEPHONE (OUTPATIENT)
Dept: FAMILY MEDICINE | Facility: CLINIC | Age: 73
End: 2023-07-24
Payer: MEDICARE

## 2023-07-24 NOTE — TELEPHONE ENCOUNTER
Please call [facility] and find out to who and what fax they have been sending request for CPAP since we send back everything that is requested    A must be sending it to a physician that signed previous or sending it to the wrong clinic.    I do not have anything on my desk but I have other orders for CPAP from [facility] on other patients so I know we get them

## 2023-07-24 NOTE — TELEPHONE ENCOUNTER
----- Message from Priscilla Neil sent at 7/24/2023 10:54 AM CDT -----  Regarding: self [phone]  Type: Patient Call Back    Who called:self     What is the request in detail:pt stated she has been trying to get cpap supplies for months, she stated [facility] told her they have tried to contact the office with no response. She is asking for an RX to be sent to [facility].     Can the clinic reply by [facility]? no    Would the patient rather a call back or a response via [facility]? Call back     Best call back number: [phone]

## 2023-08-09 DIAGNOSIS — E11.9 TYPE 2 DIABETES MELLITUS WITHOUT COMPLICATION: ICD-10-CM

## 2023-08-14 ENCOUNTER — PATIENT MESSAGE (OUTPATIENT)
Dept: ADMINISTRATIVE | Facility: HOSPITAL | Age: 73
End: 2023-08-14
Payer: MEDICARE

## 2023-09-07 ENCOUNTER — PATIENT MESSAGE (OUTPATIENT)
Dept: ADMINISTRATIVE | Facility: HOSPITAL | Age: 73
End: 2023-09-07
Payer: MEDICARE

## 2023-09-07 ENCOUNTER — PATIENT OUTREACH (OUTPATIENT)
Dept: ADMINISTRATIVE | Facility: HOSPITAL | Age: 73
End: 2023-09-07
Payer: MEDICARE

## 2023-09-07 DIAGNOSIS — E11.9 TYPE 2 DIABETES MELLITUS WITHOUT COMPLICATION, WITHOUT LONG-TERM CURRENT USE OF INSULIN: Primary | ICD-10-CM

## 2023-09-21 LAB
CHOL/HDLC RATIO: 4.1
CHOLESTEROL, TOTAL: 161
HBA1C MFR BLD: 6.7 % (ref 4–6)
HDLC SERPL-MCNC: 39 MG/DL
LDLC SERPL CALC-MCNC: 97 MG/DL
NON HDL CHOL (CALC): 122
TRIGL SERPL-MCNC: 155 MG/DL

## 2023-09-27 LAB — CRC RECOMMENDATION EXT: NORMAL

## 2023-10-03 ENCOUNTER — PATIENT OUTREACH (OUTPATIENT)
Dept: ADMINISTRATIVE | Facility: HOSPITAL | Age: 73
End: 2023-10-03
Payer: MEDICARE

## 2023-10-16 ENCOUNTER — PATIENT OUTREACH (OUTPATIENT)
Dept: ADMINISTRATIVE | Facility: HOSPITAL | Age: 73
End: 2023-10-16
Payer: MEDICARE

## 2024-01-17 ENCOUNTER — PATIENT MESSAGE (OUTPATIENT)
Dept: FAMILY MEDICINE | Facility: CLINIC | Age: 74
End: 2024-01-17
Payer: MEDICARE

## 2024-01-19 ENCOUNTER — OFFICE VISIT (OUTPATIENT)
Dept: FAMILY MEDICINE | Facility: CLINIC | Age: 74
End: 2024-01-19
Payer: MEDICARE

## 2024-01-19 VITALS
TEMPERATURE: 98 F | OXYGEN SATURATION: 95 % | BODY MASS INDEX: 42.73 KG/M2 | HEART RATE: 68 BPM | HEIGHT: 60 IN | WEIGHT: 217.63 LBS | DIASTOLIC BLOOD PRESSURE: 72 MMHG | SYSTOLIC BLOOD PRESSURE: 126 MMHG

## 2024-01-19 DIAGNOSIS — N18.32 STAGE 3B CHRONIC KIDNEY DISEASE: ICD-10-CM

## 2024-01-19 DIAGNOSIS — R42 DIZZINESS: ICD-10-CM

## 2024-01-19 DIAGNOSIS — Z09 FOLLOW-UP EXAM: Primary | ICD-10-CM

## 2024-01-19 DIAGNOSIS — E78.2 COMBINED HYPERLIPIDEMIA ASSOCIATED WITH TYPE 2 DIABETES MELLITUS: ICD-10-CM

## 2024-01-19 DIAGNOSIS — E66.01 MORBID OBESITY: ICD-10-CM

## 2024-01-19 DIAGNOSIS — E11.69 COMBINED HYPERLIPIDEMIA ASSOCIATED WITH TYPE 2 DIABETES MELLITUS: ICD-10-CM

## 2024-01-19 PROCEDURE — 3288F FALL RISK ASSESSMENT DOCD: CPT | Mod: CPTII,S$GLB,,

## 2024-01-19 PROCEDURE — 1159F MED LIST DOCD IN RCRD: CPT | Mod: CPTII,S$GLB,,

## 2024-01-19 PROCEDURE — 3074F SYST BP LT 130 MM HG: CPT | Mod: CPTII,S$GLB,,

## 2024-01-19 PROCEDURE — 1101F PT FALLS ASSESS-DOCD LE1/YR: CPT | Mod: CPTII,S$GLB,,

## 2024-01-19 PROCEDURE — 99213 OFFICE O/P EST LOW 20 MIN: CPT | Mod: S$GLB,,,

## 2024-01-19 PROCEDURE — 1126F AMNT PAIN NOTED NONE PRSNT: CPT | Mod: CPTII,S$GLB,,

## 2024-01-19 PROCEDURE — 99999 PR PBB SHADOW E&M-EST. PATIENT-LVL III: CPT | Mod: PBBFAC,,,

## 2024-01-19 PROCEDURE — 3078F DIAST BP <80 MM HG: CPT | Mod: CPTII,S$GLB,,

## 2024-01-19 RX ORDER — AMOXICILLIN AND CLAVULANATE POTASSIUM 875; 125 MG/1; MG/1
1 TABLET, FILM COATED ORAL 2 TIMES DAILY
COMMUNITY
Start: 2024-01-11 | End: 2024-01-21

## 2024-01-19 NOTE — PROGRESS NOTES
HPI     Chief Complaint:  Chief Complaint   Patient presents with    Hospital Follow Up       Andrade Ojeda is a 74 y.o. female with multiple medical diagnoses as listed in the medical history and problem list that presents for ED follow up.  Pt is not known to me with her last appointment Visit date not found.      HPI  Pt presents for ED follow up. Feeling better with improvement of dizziness. Blood sugars around 150s via Dexcom.    ED Course as of 01/11/24 0329   Thu Jan 11, 2024   0326 73-year-old female presents to the emergency department for evaluation of dizziness. Symptoms began acutely. Symptoms are resolved. Unclear etiology. Unclear whether could be posterior circulation insult. Nonetheless, symptoms are resolved. Patient is on long-term anticoagulant secondary to history of atrial fibrillation. EKG independently reviewed and interpreted myself, evidence of atrial fibrillation. No evidence of acute infarction. High sensitivity troponin normal. Glucose elevated consistent with diabetes. Renal function slightly decreased, at baseline. No evidence of urinary tract infection. Mild leukocytosis, nonspecific. Chest x-ray independently reviewed and interpreted myself, no evidence of pneumonia, pneumothorax, pleural effusion. CT of the head ordered, images reviewed. No evidence of acute intracranial hemorrhage. There is questionable signs of mastoiditis. Unclear whether CT findings may show evidence that would suggest peripheral vertigo. I discussed patient's symptoms with her and her family member who was at the bedside. I advised that the safest route would be to admit the patient for neurology evaluation, possible MRI of the brain. Patient reports she feels resolved and improved at this time and does not want to stay in the hospital. I will discharge patient at her request. [MR]       Assessment & Plan     Problem List Items Addressed This Visit          Cardiac/Vascular    Combined hyperlipidemia  associated with type 2 diabetes mellitus  Blood sugars around 150s via Dexcom. Continue healthy diet and exercise for weight loss and to achieve normal blood sugar levels.         Renal/    Stage 3b chronic kidney disease  Most recent GFR 37, BUN 38.7. Pt requesting referral to nephrology for evaluation.    Relevant Orders    Ambulatory referral/consult to Nephrology       Endocrine    Morbid obesity  Continue healthy diet and exercise for weight loss.       Other Visit Diagnoses       Follow-up exam    -  Primary  ED follow up for dizziness.  Head CT and chest x-ray within normal limits.  EKG shows chronic AFib.  Lab results stable with hyperglycemia.  Dizziness has improved.    Dizziness      ED follow up for dizziness.  Head CT and chest x-ray within normal limits.  EKG shows chronic AFib.  Lab results stable with hyperglycemia.  Dizziness has improved.              --------------------------------------------      Health Maintenance:  Health Maintenance         Date Due Completion Date    RSV Vaccine (Age 60+ and Pregnant patients) (1 - 1-dose 60+ series) Never done ---    Pneumococcal Vaccines (Age 65+) (2 - PPSV23 or PCV20) 12/16/2020 10/21/2020    Diabetes Urine Screening 06/16/2021 6/16/2020    COVID-19 Vaccine (4 - 2023-24 season) 09/01/2023 12/10/2021    Eye Exam 02/13/2024 2/13/2023    Hemoglobin A1c 03/21/2024 9/21/2023    Mammogram 09/01/2024 9/1/2023    DEXA Scan 09/01/2026 9/1/2023    TETANUS VACCINE 08/04/2030 8/4/2020            Discussed the importance of overdue vaccines which were offered during this encounter. Patient declined overdue vaccines at this time and Health maintenance reviewed    Follow Up:  No follow-ups on file.    Exam     Review of Systems:  (as noted above)  Review of Systems   Constitutional:  Negative for fever.   HENT:  Negative for trouble swallowing.    Eyes:  Negative for visual disturbance.   Respiratory:  Negative for chest tightness and shortness of breath.     Cardiovascular:  Negative for chest pain.   Gastrointestinal:  Negative for blood in stool.       Physical Exam:   Physical Exam  Constitutional:       General: She is not in acute distress.     Appearance: She is obese. She is not ill-appearing or diaphoretic.   HENT:      Head: Normocephalic and atraumatic.   Cardiovascular:      Rate and Rhythm: Normal rate and regular rhythm.      Heart sounds: No murmur heard.     No friction rub. No gallop.   Pulmonary:      Effort: No respiratory distress.   Chest:      Chest wall: No tenderness.   Musculoskeletal:      Cervical back: No rigidity.   Neurological:      Mental Status: She is alert and oriented to person, place, and time.       Vitals:    01/19/24 1326   BP: 126/72   Pulse: 68   Temp: 98 °F (36.7 °C)   TempSrc: Oral   SpO2: 95%   Weight: 98.7 kg (217 lb 9.5 oz)   Height: 5' (1.524 m)      Body mass index is 42.5 kg/m².        History     Past Medical History:  Past Medical History:   Diagnosis Date    Anticoagulant long-term use     Plavix    Arthritis     Diabetes mellitus type II     Diabetes mellitus with renal manifestations, uncontrolled 5/5/2015    Edema leg     Chronic    High-density lipoprotein deficiency 2/24/2014    History of acute post-streptococcal glomerulonephritis     Hyperlipidemia     Hyperparathyroidism 2/24/2014    Hypertension     Obesity     Sleep apnea     Stroke approx 2013    CVA or TIA--    Thyroid disease     Vitamin D deficiency disease 2/24/2014       Past Surgical History:  Past Surgical History:   Procedure Laterality Date    APPENDECTOMY      HYSTERECTOMY      JOINT REPLACEMENT Right     knee    rectum surgery      to stop bleeding    Sleep apnea Surgery      THYROIDECTOMY, PARTIAL  4/2005    80% of the Left and All of the Right    TONSILLECTOMY         Social History:  Social History     Socioeconomic History    Marital status:    Tobacco Use    Smoking status: Never    Smokeless tobacco: Never   Substance and Sexual  Activity    Alcohol use: Yes     Comment: rarely    Drug use: No       Family History:  Family History   Problem Relation Age of Onset    Cancer Mother         Breast    Cancer Father         Stomach       Allergies and Medications: (updated and reviewed)  Review of patient's allergies indicates:  No Known Allergies  Current Outpatient Medications   Medication Sig Dispense Refill    alendronate (FOSAMAX) 70 MG tablet TAKE 1 TABLET ONCE PER WEEK  1    amoxicillin-clavulanate 875-125mg (AUGMENTIN) 875-125 mg per tablet Take 1 tablet by mouth 2 (two) times daily.      apixaban (ELIQUIS) 5 mg Tab Eliquis 5 mg tablet   TK 1 T PO BID      atorvastatin (LIPITOR) 40 MG tablet Take 1 tablet by mouth once daily.      blood sugar diagnostic (CONTOUR TEST STRIPS MISC) Contour Test Strips   U TID      blood-glucose meter kit Use as instructed      cefdinir (OMNICEF) 300 MG capsule Take 300 mg by mouth 2 (two) times daily.      clobetasoL (TEMOVATE) 0.05 % cream Apply topically 2 (two) times daily. 60 g 4    clonazePAM (KLONOPIN) 0.5 MG tablet Take 1 tablet (0.5 mg total) by mouth nightly as needed for Anxiety. 30 tablet 5    clopidogrel (PLAVIX) 75 mg tablet TAKE 1 TABLET(75 MG) BY MOUTH EVERY DAY 30 tablet 12    CONTOUR TEST STRIPS Strp 1 strip by Other route 2 (two) times a day. Test 2 times per day 200 strip 12    diltiaZEM (TIAZAC) 240 MG Cs24 Take 1 capsule by mouth once daily.      fluticasone propionate (FLONASE) 50 mcg/actuation nasal spray SHAKE LIQUID AND USE 1 SPRAY(50 MCG) IN EACH NOSTRIL EVERY DAY 16 mL 0    furosemide (LASIX) 20 MG tablet Take 20 mg by mouth.      glipiZIDE (GLUCOTROL) 5 MG tablet Take 5 mg by mouth.      HUMALOG KWIKPEN INSULIN 100 unit/mL pen SMARTSI Unit(s) SUB-Q 3 Times Daily      insulin detemir U-100 (LEVEMIR FLEXTOUCH U-100 INSULN) 100 unit/mL (3 mL) InPn pen Levemir FlexTouch U-100 Insulin 100 unit/mL (3 mL) subcutaneous pen      insulin glargine U-300 conc (TOUJEO MAX U-300 SOLOSTAR)  "300 unit/mL (3 mL) InPn 14 units a day 9 Syringe 12    insulin lispro (HUMALOG) 100 unit/mL injection Inject into the skin 2 (two) times daily with meals. Uses a sliding scale      ipratropium (ATROVENT) 0.03 % nasal spray USE 2 SPRAYS IN EACH NOSTRIL THREE TIMES DAILY FOR 10 DAYS 30 mL 0    JANUVIA 50 mg Tab TAKE 1 TABLET(50 MG) BY MOUTH EVERY DAY 90 tablet 0    omega-3 fatty acids-vitamin E (FISH OIL) 1,000 mg Cap Take 1 capsule by mouth 3 (three) times daily. 1 Capsule Oral Twice a day (Patient taking differently: Take 2 capsules by mouth Daily. 1 Capsule Oral Twice a day) 90 each 0    pen needle, diabetic (NOVOFINE PLUS) 32 gauge x 1/6" Ndle 1 each by Other route.      SITagliptin (JANUVIA) 100 MG Tab Januvia 100 mg tablet      solifenacin (VESICARE) 10 MG tablet Take 10 mg by mouth.      telmisartan (MICARDIS) 80 MG Tab Take 1 tablet by mouth once daily.      traMADoL (ULTRAM) 50 mg tablet Take 1 tablet (50 mg total) by mouth 3 (three) times daily. 1-2 every 6 hrs prn pain 270 each 5    vit A/vit C/vit E/zinc/copper (PRESERVISION AREDS ORAL) Take 1 tablet by mouth.       No current facility-administered medications for this visit.       Patient Care Team:  García Saenz MD as PCP - General (Internal Medicine)  Kannan Ruiz MD (Inactive) as Consulting Physician (Ophthalmology)  Mona Gant MA as Care Coordinator         - The patient is given an After Visit Summary that lists all medications with directions, allergies, education, orders placed during this encounter and follow-up instructions.      - I have reviewed the patient's medical information including past medical, family, and social history sections including the medications and allergies.      - We discussed the patient's current medications.     This note was created by combination of typed  and MModal dictation.  Transcription errors may be present.  If there are any questions, please contact me.       Madeline Chase NP "

## 2024-01-30 ENCOUNTER — HOSPITAL ENCOUNTER (EMERGENCY)
Facility: HOSPITAL | Age: 74
Discharge: HOME | End: 2024-01-30
Attending: STUDENT IN AN ORGANIZED HEALTH CARE EDUCATION/TRAINING PROGRAM
Payer: MEDICARE

## 2024-01-30 VITALS
TEMPERATURE: 98 F | WEIGHT: 217 LBS | DIASTOLIC BLOOD PRESSURE: 65 MMHG | RESPIRATION RATE: 16 BRPM | HEIGHT: 60 IN | OXYGEN SATURATION: 98 % | HEART RATE: 76 BPM | SYSTOLIC BLOOD PRESSURE: 123 MMHG | BODY MASS INDEX: 42.6 KG/M2

## 2024-01-30 DIAGNOSIS — M79.671 RIGHT FOOT PAIN: Primary | ICD-10-CM

## 2024-01-30 DIAGNOSIS — M72.2 PLANTAR FASCIITIS, RIGHT: ICD-10-CM

## 2024-01-30 PROCEDURE — 99283 EMERGENCY DEPT VISIT LOW MDM: CPT

## 2024-01-30 RX ORDER — ACETAMINOPHEN 500 MG
500 TABLET ORAL EVERY 6 HOURS PRN
Qty: 20 TABLET | Refills: 0 | Status: SHIPPED | OUTPATIENT
Start: 2024-01-30

## 2024-01-31 NOTE — ED PROVIDER NOTES
Encounter Date: 1/30/2024       History     Chief Complaint   Patient presents with    Foot Pain     Right foot pain since waking this am. Pt is unable to bear weight. Denies hx of gout. Denies injury. States pain began last night while in bed. Pt reports normal  activity yesterday.      74 y.o.female who has a past medical history of Anticoagulant long-term use, Arthritis, Diabetes mellitus type II, Diabetes mellitus with renal manifestations, uncontrolled, Edema leg, High-density lipoprotein deficiency, History of acute post-streptococcal glomerulonephritis, Hyperlipidemia, Hyperparathyroidism, Hypertension, Obesity, Sleep apnea, Stroke, Thyroid disease, and Vitamin D deficiency disease who presents to emergency department with pain to her right foot.  Patient reports pain is worsened with movement of her great toe and begins in the plantar aspect her foot and radiates upwards.  Denies any trauma to the region.  She does report wearing soft slippers recently.  Denies any extraneous activity.  Denies any numbness or tingling.  Reports being compliant with her medications    The history is provided by the patient and a relative.     Review of patient's allergies indicates:  No Known Allergies  Past Medical History:   Diagnosis Date    Anticoagulant long-term use     Plavix    Arthritis     Diabetes mellitus type II     Diabetes mellitus with renal manifestations, uncontrolled 5/5/2015    Edema leg     Chronic    High-density lipoprotein deficiency 2/24/2014    History of acute post-streptococcal glomerulonephritis     Hyperlipidemia     Hyperparathyroidism 2/24/2014    Hypertension     Obesity     Sleep apnea     Stroke approx 2013    CVA or TIA--    Thyroid disease     Vitamin D deficiency disease 2/24/2014     Past Surgical History:   Procedure Laterality Date    APPENDECTOMY      HYSTERECTOMY      JOINT REPLACEMENT Right     knee    rectum surgery      to stop bleeding    Sleep apnea Surgery      THYROIDECTOMY,  PARTIAL  4/2005    80% of the Left and All of the Right    TONSILLECTOMY       Family History   Problem Relation Age of Onset    Cancer Mother         Breast    Cancer Father         Stomach     Social History     Tobacco Use    Smoking status: Never    Smokeless tobacco: Never   Substance Use Topics    Alcohol use: Yes     Comment: rarely    Drug use: No     Review of Systems   Constitutional:  Negative for fever.   HENT:  Negative for sore throat.    Respiratory:  Negative for shortness of breath.    Cardiovascular:  Negative for chest pain.   Gastrointestinal:  Negative for nausea.   Genitourinary:  Negative for dysuria.   Musculoskeletal:  Positive for arthralgias. Negative for back pain.   Skin:  Negative for rash.   Neurological:  Negative for weakness.   Hematological:  Does not bruise/bleed easily.       Physical Exam     Initial Vitals [01/30/24 1830]   BP Pulse Resp Temp SpO2   (!) 153/77 77 20 97.5 °F (36.4 °C) 97 %      MAP       --         Physical Exam    Musculoskeletal:      Right ankle: Normal range of motion. Normal pulse.      Right Achilles Tendon: No tenderness or defects. Au's test negative.      Right foot: Bony tenderness present. No swelling, Charcot foot or foot drop.      Left foot: Normal.        Feet:            ED Course   Procedures  Labs Reviewed - No data to display       Imaging Results              X-Ray Ankle Complete Right (Final result)  Result time 01/30/24 20:53:21      Final result by Daryl Cornejo MD (01/30/24 20:53:21)                   Impression:      No acute right ankle fracture or dislocation.    Mild soft tissue swelling.  Correlate for edema.      Electronically signed by: Daryl Cornejo  Date:    01/30/2024  Time:    20:53               Narrative:    EXAMINATION:  XR ANKLE COMPLETE 3 VIEW RIGHT    CLINICAL HISTORY:  Pain in right lower leg    TECHNIQUE:  AP, lateral, and oblique images of the right ankle were  performed.    COMPARISON:  None    FINDINGS:  Degenerative changes are noted the ankle acute fracture dislocation.  No signs of effusion.  Soft tissue swelling and mild edema in the subcu suggests lymphedema.                                       X-Ray Foot Complete Right (Final result)  Result time 01/30/24 20:44:04      Final result by Carlos Cm MD (01/30/24 20:44:04)                   Impression:      1. No acute displaced fracture or dislocation of the foot.      Electronically signed by: Carlos Cm MD  Date:    01/30/2024  Time:    20:44               Narrative:    EXAMINATION:  XR FOOT COMPLETE 3 VIEW RIGHT    CLINICAL HISTORY:  . Pain in right lower leg    TECHNIQUE:  AP, lateral, and oblique views of the right foot were performed.    COMPARISON:  None    FINDINGS:  Three views right foot.    There is osteopenia.  There is hallux valgus.  There are degenerative changes about the 1st metatarsophalangeal joint.  No radiopaque foreign body.                                       Medications - No data to display  Medical Decision Making:   History:   Old Medical Records: I decided to obtain old medical records.  Initial Assessment:   74 y.o.female who has a past medical history of Anticoagulant long-term use, Arthritis, Diabetes mellitus type II, Diabetes mellitus with renal manifestations, uncontrolled, Edema leg, High-density lipoprotein deficiency, History of acute post-streptococcal glomerulonephritis, Hyperlipidemia, Hyperparathyroidism, Hypertension, Obesity, Sleep apnea, Stroke, Thyroid disease, and Vitamin D deficiency disease who presents to emergency department with pain to her right foot.  Patient in no acute distress, exam notable for tenderness to palpation in the plantar aspect of the foot.  No swelling, erythema or signs of infection.  Suspect patient's symptoms are secondary to plantar fasciitis.  Will obtain x-ray to assess for any acute osseous abnormalities.  Low suspicion for DVT  no significant swelling, and patient currently anticoagulated.  Differential Diagnosis:   Differential Diagnosis includes, but is not limited to:  Fracture, dislocation, compartment syndrome, nerve injury/palsy, vascular injury, DVT, rhabdomyolysis, hemarthrosis, septic joint, cellulitis, bursitis, muscle strain, ligament tear/sprain, laceration, foreign body, abrasion, soft tissue contusion, osteoarthritis.     Clinical Tests:   Radiological Study: Ordered and Reviewed             ED Course as of 01/30/24 2202 Tue Jan 30, 2024 2114 X-ray imaging without any acute osseous abnormalities.  Of the ankle.  DP and PT pulses intact.  Suspect patient's symptoms are secondary to plantar fasciitis.  Anticipatory guidance and return precautions discussed with patient's, all questions answered. [AS]      ED Course User Index  [AS] Leo Myers MD          Medical Decision Making  Risk  OTC drugs.           Clinical Impression:   Final diagnoses:  [M79.671] Right foot pain (Primary)  [M72.2] Plantar fasciitis, right          ED Disposition Condition    Discharge Stable          ED Prescriptions       Medication Sig Dispense Start Date End Date Auth. Provider    acetaminophen (TYLENOL) 500 MG tablet Take 1 tablet (500 mg total) by mouth every 6 (six) hours as needed for Pain. 20 tablet 1/30/2024 -- Leo Myers MD          Follow-up Information       Follow up With Specialties Details Why Contact Info    García Saenz MD Internal Medicine Schedule an appointment as soon as possible for a visit  for reassesment [address] [facility] [address]  [phone]      [facility] - Emergency Dept Emergency Medicine  As needed [address] [facility] [address] [phone]            DISCLAIMER: This note was prepared with wmbly voice recognition transcription software. Garbled syntax, mangled pronouns, and other bizarre constructions may be attributed to that software system.     Leo Myers  MD  01/30/24 [id number]

## 2024-01-31 NOTE — DISCHARGE INSTRUCTIONS
Thank you for coming to our Emergency Department today. It is important to remember that some problems are difficult to diagnose and may not be found during your first visit. Be sure to follow up with your primary care doctor and review any labs/imaging that was performed with them. If you do not have a primary care doctor, you may contact the one listed on your discharge paperwork or you may also call the [facility] Appointment Desk at [phone] to schedule an appointment with one.     All medications may potentially have side effects and it is impossible to predict which medications may give you side effects. If you feel that you are having a negative effect of any medication you should immediately stop taking them and seek medical attention.    Return to the ER with any questions/concerns, new/concerning symptoms, worsening or failure to improve. Do not drive or make any important decisions for 24 hours if you have received any pain medications, sedatives or mood altering drugs during your ER visit.

## 2024-02-22 ENCOUNTER — TELEPHONE (OUTPATIENT)
Dept: NEPHROLOGY | Facility: CLINIC | Age: 74
End: 2024-02-22
Payer: MEDICARE

## 2024-02-22 DIAGNOSIS — N18.1 CKD (CHRONIC KIDNEY DISEASE) STAGE 1, GFR 90 ML/MIN OR GREATER: Primary | ICD-10-CM

## 2024-02-22 NOTE — TELEPHONE ENCOUNTER
I spoke with this patient on today in reference of having labs done for Dr. Boucher prior ov. Also, informed that a urine specimen was ordered.

## 2024-02-27 ENCOUNTER — TELEPHONE (OUTPATIENT)
Dept: NEPHROLOGY | Facility: CLINIC | Age: 74
End: 2024-02-27
Payer: MEDICARE

## 2024-02-27 NOTE — TELEPHONE ENCOUNTER
I left a message for this patient in regards to reminder to have labs drawn prior appointment with Dr. Boucher. Call back number given if assistance is needed to schedule.

## 2024-02-29 ENCOUNTER — LAB VISIT (OUTPATIENT)
Dept: LAB | Facility: HOSPITAL | Age: 74
End: 2024-02-29
Payer: MEDICARE

## 2024-02-29 DIAGNOSIS — N18.1 CKD (CHRONIC KIDNEY DISEASE) STAGE 1, GFR 90 ML/MIN OR GREATER: ICD-10-CM

## 2024-02-29 LAB
BACTERIA #/AREA URNS AUTO: NORMAL /HPF
BILIRUB UR QL STRIP: NEGATIVE
CLARITY UR REFRACT.AUTO: CLEAR
COLOR UR AUTO: COLORLESS
CREAT UR-MCNC: 21 MG/DL (ref 15–325)
GLUCOSE UR QL STRIP: ABNORMAL
HGB UR QL STRIP: NEGATIVE
KETONES UR QL STRIP: NEGATIVE
LEUKOCYTE ESTERASE UR QL STRIP: NEGATIVE
MICROSCOPIC COMMENT: NORMAL
NITRITE UR QL STRIP: NEGATIVE
PH UR STRIP: 5 [PH] (ref 5–8)
PROT UR QL STRIP: NEGATIVE
PROT UR-MCNC: <7 MG/DL (ref 0–15)
PROT/CREAT UR: NORMAL MG/G{CREAT} (ref 0–0.2)
RBC #/AREA URNS AUTO: 1 /HPF (ref 0–4)
SP GR UR STRIP: 1.01 (ref 1–1.03)
SQUAMOUS #/AREA URNS AUTO: 0 /HPF
URN SPEC COLLECT METH UR: ABNORMAL
WBC #/AREA URNS AUTO: 1 /HPF (ref 0–5)
YEAST UR QL AUTO: NORMAL

## 2024-02-29 PROCEDURE — 81001 URINALYSIS AUTO W/SCOPE: CPT | Performed by: INTERNAL MEDICINE

## 2024-02-29 PROCEDURE — 84156 ASSAY OF PROTEIN URINE: CPT | Performed by: INTERNAL MEDICINE

## 2024-03-05 PROBLEM — N18.31 STAGE 3A CHRONIC KIDNEY DISEASE: Status: ACTIVE | Noted: 2023-04-27

## 2024-03-05 NOTE — PROGRESS NOTES
[facility]  [address] [facility] [address], [address]  [place]  [phone]    Referring Provider: Madeline Chase NP  PCP: García Saenz MD  Cardiologist: Karsihma  Endo: Tanya      HPI: Andrade Ojeda is a 74 y.o. female with T2DM, HTN, afib, hypothyroidism s/p partial thyroidectomy, h/o post-strep GN, and CKD who is here for new patient evaluation of Chronic Kidney Disease  Prior records obtained and reviewed. Her baseline Cr has been 1.0-1.2 with GFR 47-58% since at least 2009. No h/o proteinuria. She is on telmisartan and Jardiance. Chart review also reveals hypercalcemia since at least 2009 with hyperparathyroidism since 2010.     She reports longstanding h/o T2DM. Highest A1c on file is 8.6 in 2013. Her diabetes is managed by Endocrine.   She reports longstanding h/o hypercalcemia and hyperparathyroidism which is also monitored by Dr. Martínez. She notes her Ca levels vary from month to month. She undergoes annual imaging. She has not required cinacalcet. No h/o kidney stones.   She reports being diagnosed with post-strep GN in 1979 during evaluation for anasarca. She cannot recall if she underwent kidney biopsy. She took a prolonged course of penicillin with improvement. No issues since.  BP controlled at home. She notes occasional L ankle edema which improves with ambulation. She takes lasix 20mg daily. She reports excellent water intake. She tracks her UOP which is 2.5-4L/day.   Her  received PD for 3 years prior to his passing.   No h/o gout or significant NSAID use.         Past Medical History:   Diagnosis Date    Anticoagulant long-term use     Plavix    Arthritis     Diabetes mellitus type II     Diabetes mellitus with renal manifestations, uncontrolled 5/5/2015    Edema leg     Chronic    High-density lipoprotein deficiency 2/24/2014    History of acute post-streptococcal glomerulonephritis     Hyperlipidemia     Hyperparathyroidism 2/24/2014    Hypertension     Obesity     Sleep  apnea     Stroke approx     CVA or TIA--    Thyroid disease     Vitamin D deficiency disease 2014       Past Surgical History:   Procedure Laterality Date    APPENDECTOMY      HYSTERECTOMY      JOINT REPLACEMENT Right     knee    rectum surgery      to stop bleeding    Sleep apnea Surgery      THYROIDECTOMY, PARTIAL  2005    80% of the Left and All of the Right    TONSILLECTOMY         Family History   Problem Relation Age of Onset    Cancer Mother         Breast    Cancer Father         Stomach       Social History     Tobacco Use    Smoking status: Never    Smokeless tobacco: Never   Substance Use Topics    Alcohol use: Yes     Comment: rarely    Drug use: No         ROS:  Complete ROS otherwise negative except as indicated above.         Current Outpatient Medications:     apixaban (ELIQUIS) 5 mg Tab, Eliquis 5 mg tablet  TK 1 T PO BID, Disp: , Rfl:     atorvastatin (LIPITOR) 40 MG tablet, Take 1 tablet by mouth once daily., Disp: , Rfl:     blood sugar diagnostic (CONTOUR TEST STRIPS MISC), Contour Test Strips  U TID, Disp: , Rfl:     blood-glucose meter kit, Use as instructed, Disp: , Rfl:     diltiaZEM (TIAZAC) 240 MG Cs24, Take 1 capsule by mouth once daily., Disp: , Rfl:     fluticasone propionate (FLONASE) 50 mcg/actuation nasal spray, SHAKE LIQUID AND USE 1 SPRAY(50 MCG) IN EACH NOSTRIL EVERY DAY, Disp: 16 mL, Rfl: 0    furosemide (LASIX) 20 MG tablet, Take 20 mg by mouth., Disp: , Rfl:     HUMALOG KWIKPEN INSULIN 100 unit/mL pen, SMARTSI Unit(s) SUB-Q 3 Times Daily, Disp: , Rfl:     insulin glargine U-300 conc (TOUJEO MAX U-300 SOLOSTAR) 300 unit/mL (3 mL) InPn, 14 units a day, Disp: 9 Syringe, Rfl: 12    insulin lispro (HUMALOG) 100 unit/mL injection, Inject into the skin 2 (two) times daily with meals. Uses a sliding scale, Disp: , Rfl:     JANUVIA 50 mg Tab, TAKE 1 TABLET(50 MG) BY MOUTH EVERY DAY, Disp: 90 tablet, Rfl: 0    JARDIANCE 25 mg tablet, Take 25 mg by mouth once daily., Disp:  ", Rfl:     pen needle, diabetic (NOVOFINE PLUS) 32 gauge x 1/6" Ndle, 1 each by Other route., Disp: , Rfl:     solifenacin (VESICARE) 10 MG tablet, Take 10 mg by mouth., Disp: , Rfl:     telmisartan (MICARDIS) 80 MG Tab, Take 1 tablet by mouth once daily., Disp: , Rfl:     temazepam (RESTORIL) 7.5 MG Cap, Take 15 mg by mouth nightly as needed., Disp: , Rfl:     traMADoL (ULTRAM) 50 mg tablet, Take 1 tablet (50 mg total) by mouth 3 (three) times daily. 1-2 every 6 hrs prn pain, Disp: 270 each, Rfl: 5    vit A/vit C/vit E/zinc/copper (PRESERVISION AREDS ORAL), Take 1 tablet by mouth., Disp: , Rfl:     acetaminophen (TYLENOL) 500 MG tablet, Take 1 tablet (500 mg total) by mouth every 6 (six) hours as needed for Pain. (Patient not taking: Reported on 3/6/2024), Disp: 20 tablet, Rfl: 0    alendronate (FOSAMAX) 70 MG tablet, TAKE 1 TABLET ONCE PER WEEK, Disp: , Rfl: 1    cefdinir (OMNICEF) 300 MG capsule, Take 300 mg by mouth 2 (two) times daily., Disp: , Rfl:     clobetasoL (TEMOVATE) 0.05 % cream, Apply topically 2 (two) times daily. (Patient not taking: Reported on 3/6/2024), Disp: 60 g, Rfl: 4    clonazePAM (KLONOPIN) 0.5 MG tablet, Take 1 tablet (0.5 mg total) by mouth nightly as needed for Anxiety. (Patient not taking: Reported on 3/6/2024), Disp: 30 tablet, Rfl: 5    clopidogrel (PLAVIX) 75 mg tablet, TAKE 1 TABLET(75 MG) BY MOUTH EVERY DAY (Patient not taking: Reported on 3/6/2024), Disp: 30 tablet, Rfl: 12    CONTOUR TEST STRIPS Strp, 1 strip by Other route 2 (two) times a day. Test 2 times per day (Patient not taking: Reported on 3/6/2024), Disp: 200 strip, Rfl: 12    glipiZIDE (GLUCOTROL) 5 MG tablet, Take 5 mg by mouth., Disp: , Rfl:     insulin detemir U-100 (LEVEMIR FLEXTOUCH U-100 INSULN) 100 unit/mL (3 mL) InPn pen, Levemir FlexTouch U-100 Insulin 100 unit/mL (3 mL) subcutaneous pen, Disp: , Rfl:     ipratropium (ATROVENT) 0.03 % nasal spray, USE 2 SPRAYS IN EACH NOSTRIL THREE TIMES DAILY FOR 10 DAYS " (Patient not taking: Reported on 3/6/2024), Disp: 30 mL, Rfl: 0    omega-3 fatty acids-vitamin E (FISH OIL) 1,000 mg Cap, Take 1 capsule by mouth 3 (three) times daily. 1 Capsule Oral Twice a day (Patient taking differently: Take 2 capsules by mouth Daily. 1 Capsule Oral Twice a day), Disp: 90 each, Rfl: 0    SITagliptin (JANUVIA) 100 MG Tab, Januvia 100 mg tablet, Disp: , Rfl:       Vitals: Blood pressure 122/64, pulse 69, resp. rate 18, height 5' (1.524 m), weight 98.4 kg (217 lb), SpO2 97 %. Body mass index is 42.38 kg/m².    Physical Exam  Vitals reviewed.   Constitutional:       General: She is awake. She is not in acute distress.     Appearance: Normal appearance. She is well-developed.   HENT:      Head: Normocephalic and atraumatic.      Nose: Nose normal.      Mouth/Throat:      Mouth: Mucous membranes are moist.   Eyes:      Extraocular Movements: Extraocular movements intact.      Conjunctiva/sclera: Conjunctivae normal.   Pulmonary:      Effort: Pulmonary effort is normal.   Musculoskeletal:         General: No tenderness or signs of injury.      Right lower leg: No edema.      Left lower leg: No edema.   Skin:     General: Skin is warm and dry.      Findings: No erythema or rash.   Neurological:      General: No focal deficit present.      Mental Status: She is alert. Mental status is at baseline.   Psychiatric:         Mood and Affect: Mood normal.         Behavior: Behavior normal.           Labs/Imaging:  Sodium   Date Value Ref Range Status   02/29/2024 140 136 - 145 mmol/L Final   07/12/2022 139 136 - 145 mmol/L Final   06/17/2020 141  Final   10/29/2018 142 136 - 145 mmol/L Final   07/10/2018 140 136 - 145 mmol/L Final     Potassium   Date Value Ref Range Status   02/29/2024 4.3 3.5 - 5.1 mmol/L Final   07/12/2022 4.1 3.5 - 5.1 mmol/L Final   06/17/2020 3.5  Final   10/29/2018 3.8 3.5 - 5.1 mmol/L Final   07/10/2018 3.9 3.5 - 5.1 mmol/L Final     Chloride   Date Value Ref Range Status   02/29/2024  105 95 - 110 mmol/L Final   06/17/2020 101  Final   10/29/2018 106 95 - 110 mmol/L Final   07/10/2018 104 95 - 110 mmol/L Final     CO2   Date Value Ref Range Status   02/29/2024 27 23 - 29 mmol/L Final   06/17/2020 28 21 - 32 mmol/L Final   10/29/2018 25 23 - 29 mmol/L Final   07/10/2018 27 23 - 29 mmol/L Final     Carbon Dioxide   Date Value Ref Range Status   07/12/2022 31 21 - 32 mmol/L Final     BUN   Date Value Ref Range Status   02/29/2024 30 (H) 8 - 23 mg/dL Final   07/12/2022 21.5 (H) 7.0 - 18.0 mg/dL Final   10/29/2018 20 8 - 23 mg/dL Final   07/10/2018 24 (H) 8 - 23 mg/dL Final     Creatinine   Date Value Ref Range Status   02/29/2024 1.2 0.5 - 1.4 mg/dL Final   07/12/2022 1.17 (H) 0.55 - 1.02 mg/dL Final   06/17/2020 1.1 mg/dL Final   10/29/2018 0.9 0.5 - 1.4 mg/dL Final   07/10/2018 1.1 0.5 - 1.4 mg/dL Final     eGFR   Date Value Ref Range Status   02/29/2024 46.9 (A) >60 mL/min/1.73 m^2 Final   07/12/2022 46 (L) >89 mL/min Final     Glucose   Date Value Ref Range Status   07/12/2022 188 (H) 65 - 99 mg/dL Final     Calcium   Date Value Ref Range Status   02/29/2024 11.2 (H) 8.7 - 10.5 mg/dL Final   07/12/2022 11.0 (H) 8.5 - 10.1 mg/dL Final   06/17/2020 11.1 mg/dL Final   10/29/2018 11.4 (H) 8.7 - 10.5 mg/dL Final   07/10/2018 10.8 (H) 8.7 - 10.5 mg/dL Final     Phosphorus   Date Value Ref Range Status   02/29/2024 2.7 2.7 - 4.5 mg/dL Final     Albumin   Date Value Ref Range Status   02/29/2024 3.6 3.5 - 5.2 g/dL Final   06/17/2020 4.0  Final   10/29/2018 3.6 3.5 - 5.2 g/dL Final   07/10/2018 3.7 3.5 - 5.2 g/dL Final       PTH, Intact   Date Value Ref Range Status   02/29/2024 152.9 (H) 9.0 - 77.0 pg/mL Final   10/29/2018 103.0 (H) 9.0 - 77.0 pg/mL Final   07/10/2018 109.0 (H) 9.0 - 77.0 pg/mL Final   08/26/2014 61 14 - 85 pg/mL Final     Comment:     Test performed at [facility],  [address], [place] [address]   [phone]  Michael Ken MD  - Med. Director     05/20/2014 59  14 - 85 pg/mL Final     Comment:     Test performed at [facility],  [place] [address]       Vit D, 25-Hydroxy   Date Value Ref Range Status   07/10/2018 36 30 - 96 ng/mL Final     Comment:     Vitamin D deficiency.........<10 ng/mL                              Vitamin D insufficiency......10-29 ng/mL       Vitamin D sufficiency........> or equal to 30 ng/mL  Vitamin D toxicity............>100 ng/mL       Uric Acid   Date Value Ref Range Status   02/29/2024 6.7 (H) 2.4 - 5.7 mg/dL Final       Hemoglobin   Date Value Ref Range Status   02/29/2024 15.6 12.0 - 16.0 g/dL Final   02/25/2016 11.3 (L) 12.0 - 16.0 g/dL Final   02/24/2016 11.3 (L) 12.0 - 16.0 g/dL Final       Prot/Creat Ratio, Urine   Date Value Ref Range Status   02/29/2024 Unable to calculate 0.00 - 0.20 Final   08/02/2017 0.14 0.00 - 0.20 Final           Renal US: ordered       Impression/Plan:    Stage 3a chronic kidney disease  - baseline Cr 1.0-1.2 with GFR 47-58% since at least 2009. No proteinuria  - stability over the last 15 years makes diabetic nephropathy less likely  - possibly due to residual damage from post-strep GN? Longstanding hypercalcemia?  - Cr 1.2 today; stable and at baseline  - remains without proteinuria. Continue telmisartan 80mg daily and Jardiance 25mg daily  - ordered renal US to rule out nephrocalcinosis or heavy stone burden; though less likely  - continue good water intake. Low salt diet.   - I foresee being the provider to manage this complex disease in the future.     Hypercalcemia  - associated with hyperparathyroidism  - present since at least 2009  - managed by Endocrinology  - continue excellent water intake and low salt diet  - ordered renal US as above    Hyperparathyroidism  - suspecting primary > secondary given hypercalcemia and GFR > 45%  -  today; no need for Sensipar at this time from a renal standpoint  - continue follow-up with Endocrine     Hyperuricemia  - no h/o gout or stones  - uric  acid 6.7; acceptable  - will trend           Treatment options and plan were discussed with the patient and/or caregiver.   RTC 3 months.       Unique Boucher MD  [facility]  [phone]     none

## 2024-03-06 ENCOUNTER — OFFICE VISIT (OUTPATIENT)
Dept: NEPHROLOGY | Facility: CLINIC | Age: 74
End: 2024-03-06
Payer: MEDICARE

## 2024-03-06 VITALS
HEART RATE: 69 BPM | DIASTOLIC BLOOD PRESSURE: 64 MMHG | OXYGEN SATURATION: 97 % | RESPIRATION RATE: 18 BRPM | BODY MASS INDEX: 42.6 KG/M2 | WEIGHT: 217 LBS | SYSTOLIC BLOOD PRESSURE: 122 MMHG | HEIGHT: 60 IN

## 2024-03-06 DIAGNOSIS — N18.31 STAGE 3A CHRONIC KIDNEY DISEASE: Primary | ICD-10-CM

## 2024-03-06 DIAGNOSIS — E83.52 HYPERCALCEMIA: ICD-10-CM

## 2024-03-06 DIAGNOSIS — E79.0 HYPERURICEMIA: ICD-10-CM

## 2024-03-06 DIAGNOSIS — E21.3 HYPERPARATHYROIDISM: ICD-10-CM

## 2024-03-06 PROCEDURE — 3078F DIAST BP <80 MM HG: CPT | Mod: CPTII,S$GLB,, | Performed by: INTERNAL MEDICINE

## 2024-03-06 PROCEDURE — 1126F AMNT PAIN NOTED NONE PRSNT: CPT | Mod: CPTII,S$GLB,, | Performed by: INTERNAL MEDICINE

## 2024-03-06 PROCEDURE — 1159F MED LIST DOCD IN RCRD: CPT | Mod: CPTII,S$GLB,, | Performed by: INTERNAL MEDICINE

## 2024-03-06 PROCEDURE — 1160F RVW MEDS BY RX/DR IN RCRD: CPT | Mod: CPTII,S$GLB,, | Performed by: INTERNAL MEDICINE

## 2024-03-06 PROCEDURE — 1101F PT FALLS ASSESS-DOCD LE1/YR: CPT | Mod: CPTII,S$GLB,, | Performed by: INTERNAL MEDICINE

## 2024-03-06 PROCEDURE — G2211 COMPLEX E/M VISIT ADD ON: HCPCS | Mod: S$GLB,,, | Performed by: INTERNAL MEDICINE

## 2024-03-06 PROCEDURE — 99999 PR PBB SHADOW E&M-EST. PATIENT-LVL IV: CPT | Mod: PBBFAC,,, | Performed by: INTERNAL MEDICINE

## 2024-03-06 PROCEDURE — 99204 OFFICE O/P NEW MOD 45 MIN: CPT | Mod: S$GLB,,, | Performed by: INTERNAL MEDICINE

## 2024-03-06 PROCEDURE — 3074F SYST BP LT 130 MM HG: CPT | Mod: CPTII,S$GLB,, | Performed by: INTERNAL MEDICINE

## 2024-03-06 PROCEDURE — 3288F FALL RISK ASSESSMENT DOCD: CPT | Mod: CPTII,S$GLB,, | Performed by: INTERNAL MEDICINE

## 2024-03-06 RX ORDER — TEMAZEPAM 7.5 MG/1
15 CAPSULE ORAL NIGHTLY PRN
COMMUNITY

## 2024-03-06 RX ORDER — EMPAGLIFLOZIN 25 MG/1
25 TABLET, FILM COATED ORAL DAILY
COMMUNITY

## 2024-03-06 NOTE — ASSESSMENT & PLAN NOTE
- associated with hyperparathyroidism  - present since at least 2009  - managed by Endocrinology  - continue excellent water intake and low salt diet  - ordered renal US as above

## 2024-03-06 NOTE — ASSESSMENT & PLAN NOTE
- baseline Cr 1.0-1.2 with GFR 47-58% since at least 2009. No proteinuria  - stability over the last 15 years makes diabetic nephropathy less likely  - possibly due to residual damage from post-strep GN? Longstanding hypercalcemia?  - Cr 1.2 today; stable and at baseline  - remains without proteinuria. Continue telmisartan 80mg daily and Jardiance 25mg daily  - ordered renal US to rule out nephrocalcinosis or heavy stone burden; though less likely  - continue good water intake. Low salt diet.   - I foresee being the provider to manage this complex disease in the future.

## 2024-03-06 NOTE — ASSESSMENT & PLAN NOTE
- suspecting primary > secondary given hypercalcemia and GFR > 45%  -  today; no need for Sensipar at this time from a renal standpoint  - continue follow-up with Endocrine

## 2024-03-20 ENCOUNTER — PATIENT OUTREACH (OUTPATIENT)
Dept: ADMINISTRATIVE | Facility: HOSPITAL | Age: 74
End: 2024-03-20
Payer: MEDICARE

## 2024-03-20 DIAGNOSIS — E11.9 TYPE 2 DIABETES MELLITUS WITHOUT COMPLICATION, WITHOUT LONG-TERM CURRENT USE OF INSULIN: Primary | ICD-10-CM

## 2024-04-11 LAB — CRC RECOMMENDATION EXT: NORMAL

## 2024-04-16 ENCOUNTER — PATIENT OUTREACH (OUTPATIENT)
Dept: ADMINISTRATIVE | Facility: HOSPITAL | Age: 74
End: 2024-04-16
Payer: MEDICARE

## 2024-04-30 ENCOUNTER — PATIENT OUTREACH (OUTPATIENT)
Dept: ADMINISTRATIVE | Facility: HOSPITAL | Age: 74
End: 2024-04-30
Payer: MEDICARE

## 2024-05-13 ENCOUNTER — PATIENT OUTREACH (OUTPATIENT)
Dept: ADMINISTRATIVE | Facility: HOSPITAL | Age: 74
End: 2024-05-13
Payer: MEDICARE

## 2024-06-12 ENCOUNTER — HOSPITAL ENCOUNTER (OUTPATIENT)
Dept: RADIOLOGY | Facility: HOSPITAL | Age: 74
Discharge: HOME | End: 2024-06-12
Attending: INTERNAL MEDICINE
Payer: MEDICARE

## 2024-06-12 DIAGNOSIS — E79.0 HYPERURICEMIA: ICD-10-CM

## 2024-06-12 DIAGNOSIS — E83.52 HYPERCALCEMIA: ICD-10-CM

## 2024-06-12 DIAGNOSIS — N18.31 STAGE 3A CHRONIC KIDNEY DISEASE: ICD-10-CM

## 2024-06-12 DIAGNOSIS — E21.3 HYPERPARATHYROIDISM: ICD-10-CM

## 2024-06-12 PROCEDURE — 76770 US EXAM ABDO BACK WALL COMP: CPT | Mod: 26,,, | Performed by: RADIOLOGY

## 2024-06-12 PROCEDURE — 76770 US EXAM ABDO BACK WALL COMP: CPT | Mod: TC

## 2024-06-19 NOTE — PROGRESS NOTES
[facility]  [address] [facility] [address], [address]  [place]  [phone]    PCP: García Saenz MD  Cardiologist: Karishma Lynn: Tanya      HPI: Andrade Ojeda is a 74 y.o. female with T2DM, HTN, afib, hypothyroidism s/p partial thyroidectomy, h/o post-strep GN, and CKD who is here for established patient evaluation of Chronic Kidney Disease  Initial visit was 3/6/24: Her baseline Cr has been 1.0-1.2 with GFR 47-58% since at least 2009. No h/o proteinuria. She is on telmisartan and Jardiance. Chart review also reveals hypercalcemia since at least 2009 with hyperparathyroidism since 2010.   She reports longstanding h/o T2DM. Highest A1c on file is 8.6 in 2013. Her diabetes is managed by Endocrine.   She reports longstanding h/o hypercalcemia and hyperparathyroidism which is also monitored by Dr. Martínez. She notes her Ca levels vary from month to month. She undergoes annual imaging. She has not required cinacalcet. No h/o kidney stones.   She reports being diagnosed with post-strep GN in 1979 during evaluation for anasarca. She cannot recall if she underwent kidney biopsy. She took a prolonged course of penicillin with improvement. No issues since.  BP controlled at home. She notes occasional L ankle edema which improves with ambulation. She takes lasix 20mg daily. She reports excellent water intake. She tracks her UOP which is 2.5-4L/day.   Her  received PD for 3 years prior to his passing.   No h/o gout or significant NSAID use.       Interval Hx:   LV 3/6/24: no medication changes. She was started on cinacalcet 30mg BID by Shane on 4/24.   Today she reports to be doing well. BP controlled at home. Mild leg swelling by end of day; quickly improves with elevation. Stays well-hydrated; drinks plenty of water. Takes lasix q AM.   She notes nausea with cinacalcet. She has also been suffering from diarrhea for the past week. Her next appt with Shane is in October.       ROS:  Complete ROS otherwise  negative except as indicated above.         Current Outpatient Medications:     apixaban (ELIQUIS) 5 mg Tab, Eliquis 5 mg tablet  TK 1 T PO BID, Disp: , Rfl:     atorvastatin (LIPITOR) 40 MG tablet, Take 1 tablet by mouth once daily., Disp: , Rfl:     blood sugar diagnostic (CONTOUR TEST STRIPS MISC), Contour Test Strips  U TID, Disp: , Rfl:     blood-glucose meter kit, Use as instructed, Disp: , Rfl:     cinacalcet (SENSIPAR) 30 MG Tab, Take 30 mg by mouth 2 (two) times daily., Disp: , Rfl:     clonazePAM (KLONOPIN) 0.5 MG tablet, Take 1 tablet (0.5 mg total) by mouth nightly as needed for Anxiety., Disp: 30 tablet, Rfl: 5    diltiaZEM (TIAZAC) 240 MG Cs24, Take 1 capsule by mouth once daily., Disp: , Rfl:     fluticasone propionate (FLONASE) 50 mcg/actuation nasal spray, SHAKE LIQUID AND USE 1 SPRAY(50 MCG) IN EACH NOSTRIL EVERY DAY, Disp: 16 mL, Rfl: 0    furosemide (LASIX) 20 MG tablet, Take 20 mg by mouth., Disp: , Rfl:     HUMALOG KWIKPEN INSULIN 100 unit/mL pen, SMARTSI Unit(s) SUB-Q 3 Times Daily, Disp: , Rfl:     insulin glargine U-300 conc (TOUJEO MAX U-300 SOLOSTAR) 300 unit/mL (3 mL) InPn, 14 units a day, Disp: 9 Syringe, Rfl: 12    insulin lispro (HUMALOG) 100 unit/mL injection, Inject into the skin 2 (two) times daily with meals. Uses a sliding scale, Disp: , Rfl:     JANUVIA 50 mg Tab, TAKE 1 TABLET(50 MG) BY MOUTH EVERY DAY, Disp: 90 tablet, Rfl: 0    JARDIANCE 25 mg tablet, Take 25 mg by mouth once daily., Disp: , Rfl:     omega-3 fatty acids-vitamin E (FISH OIL) 1,000 mg Cap, Take 1 capsule by mouth 3 (three) times daily. 1 Capsule Oral Twice a day (Patient taking differently: Take 2 capsules by mouth Daily. 1 Capsule Oral Twice a day), Disp: 90 each, Rfl: 0    pantoprazole (PROTONIX) 40 MG tablet, Take 40 mg by mouth every morning., Disp: , Rfl:     solifenacin (VESICARE) 10 MG tablet, Take 10 mg by mouth., Disp: , Rfl:     telmisartan (MICARDIS) 80 MG Tab, Take 1 tablet by mouth once daily.,  "Disp: , Rfl:     temazepam (RESTORIL) 7.5 MG Cap, Take 15 mg by mouth nightly as needed., Disp: , Rfl:     vit A/vit C/vit E/zinc/copper (PRESERVISION AREDS ORAL), Take 1 tablet by mouth., Disp: , Rfl:     acetaminophen (TYLENOL) 500 MG tablet, Take 1 tablet (500 mg total) by mouth every 6 (six) hours as needed for Pain. (Patient not taking: Reported on 3/6/2024), Disp: 20 tablet, Rfl: 0    alendronate (FOSAMAX) 70 MG tablet, TAKE 1 TABLET ONCE PER WEEK, Disp: , Rfl: 1    cefdinir (OMNICEF) 300 MG capsule, Take 300 mg by mouth 2 (two) times daily. (Patient not taking: Reported on 6/20/2024), Disp: , Rfl:     clobetasoL (TEMOVATE) 0.05 % cream, Apply topically 2 (two) times daily. (Patient not taking: Reported on 3/6/2024), Disp: 60 g, Rfl: 4    clopidogrel (PLAVIX) 75 mg tablet, TAKE 1 TABLET(75 MG) BY MOUTH EVERY DAY (Patient not taking: Reported on 3/6/2024), Disp: 30 tablet, Rfl: 12    CONTOUR TEST STRIPS Strp, 1 strip by Other route 2 (two) times a day. Test 2 times per day (Patient not taking: Reported on 3/6/2024), Disp: 200 strip, Rfl: 12    glipiZIDE (GLUCOTROL) 5 MG tablet, Take 5 mg by mouth., Disp: , Rfl:     insulin detemir U-100 (LEVEMIR FLEXTOUCH U-100 INSULN) 100 unit/mL (3 mL) InPn pen, Levemir FlexTouch U-100 Insulin 100 unit/mL (3 mL) subcutaneous pen, Disp: , Rfl:     ipratropium (ATROVENT) 0.03 % nasal spray, USE 2 SPRAYS IN EACH NOSTRIL THREE TIMES DAILY FOR 10 DAYS (Patient not taking: Reported on 3/6/2024), Disp: 30 mL, Rfl: 0    pen needle, diabetic (NOVOFINE PLUS) 32 gauge x 1/6" Ndle, 1 each by Other route. (Patient not taking: Reported on 6/20/2024), Disp: , Rfl:     SITagliptin (JANUVIA) 100 MG Tab, Januvia 100 mg tablet (Patient not taking: Reported on 6/20/2024), Disp: , Rfl:     traMADoL (ULTRAM) 50 mg tablet, Take 1 tablet (50 mg total) by mouth 3 (three) times daily. 1-2 every 6 hrs prn pain, Disp: 270 each, Rfl: 5      Vitals: Blood pressure 118/70, pulse 89, resp. rate 18, height " 5' (1.524 m), weight 98.9 kg (218 lb 2.3 oz), SpO2 95%. Body mass index is 42.6 kg/m².    Physical Exam  Vitals reviewed.   Constitutional:       General: She is awake. She is not in acute distress.     Appearance: Normal appearance. She is well-developed.   HENT:      Head: Normocephalic and atraumatic.      Nose: Nose normal.      Mouth/Throat:      Mouth: Mucous membranes are moist.   Eyes:      Extraocular Movements: Extraocular movements intact.      Conjunctiva/sclera: Conjunctivae normal.   Pulmonary:      Effort: Pulmonary effort is normal.   Musculoskeletal:         General: No tenderness or signs of injury.      Right lower leg: No edema.      Left lower leg: No edema.      Comments: BLE with trace pitting edema   Skin:     General: Skin is warm and dry.      Findings: No erythema or rash.   Neurological:      General: No focal deficit present.      Mental Status: She is alert. Mental status is at baseline.   Psychiatric:         Mood and Affect: Mood normal.         Behavior: Behavior normal.           Labs/Imaging:  Sodium   Date Value Ref Range Status   06/12/2024 141 136 - 145 mmol/L Final   02/29/2024 140 136 - 145 mmol/L Final   07/12/2022 139 136 - 145 mmol/L Final   06/17/2020 141  Final   10/29/2018 142 136 - 145 mmol/L Final     Potassium   Date Value Ref Range Status   06/12/2024 3.7 3.5 - 5.1 mmol/L Final   02/29/2024 4.3 3.5 - 5.1 mmol/L Final   07/12/2022 4.1 3.5 - 5.1 mmol/L Final   06/17/2020 3.5  Final   10/29/2018 3.8 3.5 - 5.1 mmol/L Final     Chloride   Date Value Ref Range Status   06/12/2024 109 95 - 110 mmol/L Final   02/29/2024 105 95 - 110 mmol/L Final   06/17/2020 101  Final   10/29/2018 106 95 - 110 mmol/L Final     CO2   Date Value Ref Range Status   06/12/2024 23 23 - 29 mmol/L Final   02/29/2024 27 23 - 29 mmol/L Final   06/17/2020 28 21 - 32 mmol/L Final   10/29/2018 25 23 - 29 mmol/L Final     Carbon Dioxide   Date Value Ref Range Status   07/12/2022 31 21 - 32 mmol/L Final      BUN   Date Value Ref Range Status   06/12/2024 33 (H) 8 - 23 mg/dL Final   02/29/2024 30 (H) 8 - 23 mg/dL Final   07/12/2022 21.5 (H) 7.0 - 18.0 mg/dL Final   10/29/2018 20 8 - 23 mg/dL Final     Creatinine   Date Value Ref Range Status   06/12/2024 1.3 0.5 - 1.4 mg/dL Final   02/29/2024 1.2 0.5 - 1.4 mg/dL Final   07/12/2022 1.17 (H) 0.55 - 1.02 mg/dL Final   06/17/2020 1.1 mg/dL Final   10/29/2018 0.9 0.5 - 1.4 mg/dL Final     eGFR   Date Value Ref Range Status   06/12/2024 43 (A) >60 mL/min/1.73 m^2 Final   02/29/2024 46.9 (A) >60 mL/min/1.73 m^2 Final   07/12/2022 46 (L) >89 mL/min Final     Glucose   Date Value Ref Range Status   07/12/2022 188 (H) 65 - 99 mg/dL Final     Calcium   Date Value Ref Range Status   06/12/2024 8.8 8.7 - 10.5 mg/dL Final   02/29/2024 11.2 (H) 8.7 - 10.5 mg/dL Final   07/12/2022 11.0 (H) 8.5 - 10.1 mg/dL Final   06/17/2020 11.1 mg/dL Final   10/29/2018 11.4 (H) 8.7 - 10.5 mg/dL Final     Phosphorus   Date Value Ref Range Status   06/12/2024 2.9 2.7 - 4.5 mg/dL Final   02/29/2024 2.7 2.7 - 4.5 mg/dL Final     Albumin   Date Value Ref Range Status   06/12/2024 3.3 (L) 3.5 - 5.2 g/dL Final   02/29/2024 3.6 3.5 - 5.2 g/dL Final   06/17/2020 4.0  Final   10/29/2018 3.6 3.5 - 5.2 g/dL Final       PTH, Intact   Date Value Ref Range Status   06/12/2024 138.8 (H) 9.0 - 77.0 pg/mL Final   02/29/2024 152.9 (H) 9.0 - 77.0 pg/mL Final   10/29/2018 103.0 (H) 9.0 - 77.0 pg/mL Final   08/26/2014 61 14 - 85 pg/mL Final     Comment:     Test performed at [facility],  [address], [place] [address]   [phone]  Michael Ken MD  - Med. Director     05/20/2014 59 14 - 85 pg/mL Final     Comment:     Test performed at [facility],  [place] [address]       Vit D, 25-Hydroxy   Date Value Ref Range Status   06/12/2024 20 (L) 30 - 96 ng/mL Final     Comment:     Vitamin D deficiency.........<10 ng/mL                              Vitamin D insufficiency......10-29  ng/mL       Vitamin D sufficiency........> or equal to 30 ng/mL  Vitamin D toxicity............>100 ng/mL       Uric Acid   Date Value Ref Range Status   06/12/2024 6.0 (H) 2.4 - 5.7 mg/dL Final   02/29/2024 6.7 (H) 2.4 - 5.7 mg/dL Final       Hemoglobin   Date Value Ref Range Status   06/12/2024 14.2 12.0 - 16.0 g/dL Final   02/29/2024 15.6 12.0 - 16.0 g/dL Final   02/25/2016 11.3 (L) 12.0 - 16.0 g/dL Final       Prot/Creat Ratio, Urine   Date Value Ref Range Status   06/12/2024 Unable to calculate 0.00 - 0.20 Final   02/29/2024 Unable to calculate 0.00 - 0.20 Final   08/02/2017 0.14 0.00 - 0.20 Final           Renal US: 6/12/24:   Right kidney: The right kidney measures 10.4 cm. No cortical thinning. No loss of corticomedullary distinction. Resistive index measures 0.85.  No mass. No renal stone. No hydronephrosis.     Left kidney: The left kidney measures 9.5 cm. No cortical thinning. No loss of corticomedullary distinction. Resistive index measures 0.76.  No mass. No renal stone. No hydronephrosis.         Impression/Plan:    Stage 3a chronic kidney disease  - baseline Cr 1.0-1.2 with GFR 47-58% since at least 2009. No proteinuria  - stability over the last 15 years makes diabetic nephropathy less likely  - possibly due to residual damage from post-strep GN? Longstanding hypercalcemia?  - Cr 1.2 --> 1.3 today; mildly above baseline but overall stable. CCa normal today. Renal US unremarkable  - UPCR negative. Continue telmisartan 80mg daily and Jardiance.   - continue good hydration. Discussed holding RAAS blockade and SGLT2i during times of dehydration    Hypercalcemia  - associated with hyperparathyroidism  - present since at least 2009; managed by Endocrinology  - renal US negative for stones or nephrocalcinosis  - CCa 9.3 today on cinacalcet 30mg BID; though unfortunately with GI upset  - okay from renal standpoint to decrease cinacalcet dose, but will defer to Endo.   - continue excellent water intake and  low salt diet    Hyperparathyroidism  - primary; managed by Endo  - ; at goal for CKD    Hyperuricemia  - no h/o gout or stones  - uric acid 6.7 --> 6.0; improved  - will trend     Hypertension associated with diabetes  - BP controlled. Continue max dose RAAS blockade. Continue SGLT2i      Visit today included increased complexity associated with the care of the episodic problem CKD addressed and managing the longitudinal care of the patient due to the serious and/or complex managed problem(s) CKD, HTN.      Treatment options and plan were discussed with the patient and/or caregiver.   RTC 5-6 months.       Unique Boucher MD  [facility]  [phone]

## 2024-06-20 ENCOUNTER — OFFICE VISIT (OUTPATIENT)
Dept: NEPHROLOGY | Facility: CLINIC | Age: 74
End: 2024-06-20
Payer: MEDICARE

## 2024-06-20 VITALS
SYSTOLIC BLOOD PRESSURE: 118 MMHG | WEIGHT: 218.13 LBS | BODY MASS INDEX: 42.82 KG/M2 | RESPIRATION RATE: 18 BRPM | HEIGHT: 60 IN | OXYGEN SATURATION: 95 % | HEART RATE: 89 BPM | DIASTOLIC BLOOD PRESSURE: 70 MMHG

## 2024-06-20 DIAGNOSIS — Z87.448 HISTORY OF ACUTE POST-STREPTOCOCCAL GLOMERULONEPHRITIS: ICD-10-CM

## 2024-06-20 DIAGNOSIS — N18.30 STAGE 3 CHRONIC KIDNEY DISEASE, UNSPECIFIED WHETHER STAGE 3A OR 3B CKD: Primary | ICD-10-CM

## 2024-06-20 DIAGNOSIS — E79.0 HYPERURICEMIA: ICD-10-CM

## 2024-06-20 DIAGNOSIS — E21.3 HYPERPARATHYROIDISM: ICD-10-CM

## 2024-06-20 DIAGNOSIS — I15.2 HYPERTENSION ASSOCIATED WITH DIABETES: ICD-10-CM

## 2024-06-20 DIAGNOSIS — E83.52 HYPERCALCEMIA: ICD-10-CM

## 2024-06-20 DIAGNOSIS — E11.59 HYPERTENSION ASSOCIATED WITH DIABETES: ICD-10-CM

## 2024-06-20 PROCEDURE — 3074F SYST BP LT 130 MM HG: CPT | Mod: CPTII,S$GLB,, | Performed by: INTERNAL MEDICINE

## 2024-06-20 PROCEDURE — 99999 PR PBB SHADOW E&M-EST. PATIENT-LVL V: CPT | Mod: PBBFAC,,, | Performed by: INTERNAL MEDICINE

## 2024-06-20 PROCEDURE — 1160F RVW MEDS BY RX/DR IN RCRD: CPT | Mod: CPTII,S$GLB,, | Performed by: INTERNAL MEDICINE

## 2024-06-20 PROCEDURE — 1159F MED LIST DOCD IN RCRD: CPT | Mod: CPTII,S$GLB,, | Performed by: INTERNAL MEDICINE

## 2024-06-20 PROCEDURE — 3078F DIAST BP <80 MM HG: CPT | Mod: CPTII,S$GLB,, | Performed by: INTERNAL MEDICINE

## 2024-06-20 PROCEDURE — 3288F FALL RISK ASSESSMENT DOCD: CPT | Mod: CPTII,S$GLB,, | Performed by: INTERNAL MEDICINE

## 2024-06-20 PROCEDURE — 1126F AMNT PAIN NOTED NONE PRSNT: CPT | Mod: CPTII,S$GLB,, | Performed by: INTERNAL MEDICINE

## 2024-06-20 PROCEDURE — 99214 OFFICE O/P EST MOD 30 MIN: CPT | Mod: S$GLB,,, | Performed by: INTERNAL MEDICINE

## 2024-06-20 PROCEDURE — 1101F PT FALLS ASSESS-DOCD LE1/YR: CPT | Mod: CPTII,S$GLB,, | Performed by: INTERNAL MEDICINE

## 2024-06-20 PROCEDURE — G2211 COMPLEX E/M VISIT ADD ON: HCPCS | Mod: S$GLB,,, | Performed by: INTERNAL MEDICINE

## 2024-06-20 RX ORDER — CINACALCET 30 MG/1
30 TABLET, FILM COATED ORAL 2 TIMES DAILY
COMMUNITY

## 2024-06-20 RX ORDER — PANTOPRAZOLE SODIUM 40 MG/1
40 TABLET, DELAYED RELEASE ORAL EVERY MORNING
COMMUNITY
Start: 2024-04-11

## 2024-06-20 NOTE — PATIENT INSTRUCTIONS
Continue telmisartan (Mycardis) and Jardiance. These help protect the kidneys from diabetes. Make sure to stay well-hydrated while on these medications. If you ever find yourself dehydrated, skip these medications until you improve.

## 2024-06-20 NOTE — ASSESSMENT & PLAN NOTE
- associated with hyperparathyroidism  - present since at least 2009; managed by Endocrinology  - renal US negative for stones or nephrocalcinosis  - CCa 9.3 today on cinacalcet 30mg BID; though unfortunately with GI upset  - okay from renal standpoint to decrease cinacalcet dose, but will defer to Endo.   - continue excellent water intake and low salt diet

## 2024-06-20 NOTE — ASSESSMENT & PLAN NOTE
- baseline Cr 1.0-1.2 with GFR 47-58% since at least 2009. No proteinuria  - stability over the last 15 years makes diabetic nephropathy less likely  - possibly due to residual damage from post-strep GN? Longstanding hypercalcemia?  - Cr 1.2 --> 1.3 today; mildly above baseline but overall stable. CCa normal today. Renal US unremarkable  - UPCR negative. Continue telmisartan 80mg daily and Jardiance.   - continue good hydration. Discussed holding RAAS blockade and SGLT2i during times of dehydration

## 2024-07-02 ENCOUNTER — OFFICE VISIT (OUTPATIENT)
Dept: FAMILY MEDICINE | Facility: CLINIC | Age: 74
End: 2024-07-02
Payer: MEDICARE

## 2024-07-02 VITALS
DIASTOLIC BLOOD PRESSURE: 64 MMHG | SYSTOLIC BLOOD PRESSURE: 118 MMHG | TEMPERATURE: 98 F | WEIGHT: 221.56 LBS | HEART RATE: 85 BPM | OXYGEN SATURATION: 96 % | BODY MASS INDEX: 43.5 KG/M2 | HEIGHT: 60 IN

## 2024-07-02 DIAGNOSIS — E78.2 COMBINED HYPERLIPIDEMIA ASSOCIATED WITH TYPE 2 DIABETES MELLITUS: ICD-10-CM

## 2024-07-02 DIAGNOSIS — E55.9 VITAMIN D DEFICIENCY DISEASE: ICD-10-CM

## 2024-07-02 DIAGNOSIS — I48.21 PERMANENT ATRIAL FIBRILLATION: ICD-10-CM

## 2024-07-02 DIAGNOSIS — Z79.4 LONG-TERM INSULIN USE: ICD-10-CM

## 2024-07-02 DIAGNOSIS — E11.65 UNCONTROLLED TYPE 2 DIABETES MELLITUS WITH HYPERGLYCEMIA: ICD-10-CM

## 2024-07-02 DIAGNOSIS — Z86.010 HISTORY OF COLONIC POLYPS: ICD-10-CM

## 2024-07-02 DIAGNOSIS — Z86.73 HISTORY OF STROKE: ICD-10-CM

## 2024-07-02 DIAGNOSIS — N18.32 STAGE 3B CHRONIC KIDNEY DISEASE: ICD-10-CM

## 2024-07-02 DIAGNOSIS — E11.69 COMBINED HYPERLIPIDEMIA ASSOCIATED WITH TYPE 2 DIABETES MELLITUS: ICD-10-CM

## 2024-07-02 DIAGNOSIS — E66.01 MORBID OBESITY: ICD-10-CM

## 2024-07-02 DIAGNOSIS — E21.3 HYPERPARATHYROIDISM: ICD-10-CM

## 2024-07-02 DIAGNOSIS — E11.59 HYPERTENSION ASSOCIATED WITH DIABETES: ICD-10-CM

## 2024-07-02 DIAGNOSIS — G47.00 INSOMNIA, UNSPECIFIED TYPE: ICD-10-CM

## 2024-07-02 DIAGNOSIS — M46.86 OTHER SPECIFIED INFLAMMATORY SPONDYLOPATHIES, LUMBAR REGION: ICD-10-CM

## 2024-07-02 DIAGNOSIS — Z12.31 ENCOUNTER FOR SCREENING MAMMOGRAM FOR BREAST CANCER: ICD-10-CM

## 2024-07-02 DIAGNOSIS — E78.6 HIGH-DENSITY LIPOPROTEIN DEFICIENCY: ICD-10-CM

## 2024-07-02 DIAGNOSIS — Z00.00 ROUTINE MEDICAL EXAM: Primary | ICD-10-CM

## 2024-07-02 DIAGNOSIS — G47.33 OSA (OBSTRUCTIVE SLEEP APNEA): ICD-10-CM

## 2024-07-02 DIAGNOSIS — I15.2 HYPERTENSION ASSOCIATED WITH DIABETES: ICD-10-CM

## 2024-07-02 PROCEDURE — 99214 OFFICE O/P EST MOD 30 MIN: CPT | Mod: 25,S$GLB,, | Performed by: INTERNAL MEDICINE

## 2024-07-02 PROCEDURE — 1159F MED LIST DOCD IN RCRD: CPT | Mod: CPTII,S$GLB,, | Performed by: INTERNAL MEDICINE

## 2024-07-02 PROCEDURE — 3078F DIAST BP <80 MM HG: CPT | Mod: CPTII,S$GLB,, | Performed by: INTERNAL MEDICINE

## 2024-07-02 PROCEDURE — 3074F SYST BP LT 130 MM HG: CPT | Mod: CPTII,S$GLB,, | Performed by: INTERNAL MEDICINE

## 2024-07-02 PROCEDURE — 99397 PER PM REEVAL EST PAT 65+ YR: CPT | Mod: S$GLB,,, | Performed by: INTERNAL MEDICINE

## 2024-07-02 PROCEDURE — 3288F FALL RISK ASSESSMENT DOCD: CPT | Mod: CPTII,S$GLB,, | Performed by: INTERNAL MEDICINE

## 2024-07-02 PROCEDURE — 1101F PT FALLS ASSESS-DOCD LE1/YR: CPT | Mod: CPTII,S$GLB,, | Performed by: INTERNAL MEDICINE

## 2024-07-02 PROCEDURE — 99999 PR PBB SHADOW E&M-EST. PATIENT-LVL IV: CPT | Mod: PBBFAC,,, | Performed by: INTERNAL MEDICINE

## 2024-07-02 PROCEDURE — 1126F AMNT PAIN NOTED NONE PRSNT: CPT | Mod: CPTII,S$GLB,, | Performed by: INTERNAL MEDICINE

## 2024-07-02 RX ORDER — TEMAZEPAM 7.5 MG/1
15 CAPSULE ORAL NIGHTLY PRN
Qty: 30 CAPSULE | Refills: 0 | Status: CANCELLED | OUTPATIENT
Start: 2024-07-02

## 2024-07-02 RX ORDER — TEMAZEPAM 15 MG/1
15 CAPSULE ORAL NIGHTLY PRN
Qty: 30 CAPSULE | Refills: 5 | Status: SHIPPED | OUTPATIENT
Start: 2024-07-02

## 2024-07-02 RX ORDER — AZELASTINE 1 MG/ML
2 SPRAY, METERED NASAL 2 TIMES DAILY PRN
COMMUNITY
Start: 2024-06-26 | End: 2024-07-02

## 2024-07-02 RX ORDER — OMEGA-3 FATTY ACIDS 1000 MG
1 CAPSULE ORAL 2 TIMES DAILY
COMMUNITY

## 2024-07-02 NOTE — PROGRESS NOTES
Chief complaint: physical    74-year-old white female .   Mammo     [facility], 3 polyps  --3 yrs saw Terry, scope  w 1 year rec  ??3 polyps and one was 15mm -scope  -5 yrs rec    Son is 50 with colon cancer        ROS:   CONST: weight stable. EYES: no vision change. ENT: no sore throat. CV: no chest pain w/ exertion. RESP: no shortness of breath. GI: no nausea, vomiting, diarrhea. No dysphagia. : no urinary issues. MUSCULOSKELETAL: no other new myalgias or arthralgias. SKIN: no new changes. NEURO: no focal deficits. PSYCH: no new issues. ENDOCRINE: no polyuria. HEME: no lymph nodes. ALLERGY: no general pruritis.    PAST MEDICAL HISTORY:  1. Hypertension  2. Diabetes Mellitus Type II, follows with Dr. Martínez - Endocrinology  3. History of Post Streptococcal Glomerulonephritis  4. Chronic LE Edema  5. Sleep Apnea: 1st sleep study at [facility], 2nd at [facility], 3rd at [facility]  6. Obesity  7. Hyperlipidemia  8. Postmenopausal  9. Colonoscopy 2003, Dr. Stewart - [facility], 3 polyps  --3 yrs, scope  w 1 year rec -scope  -5 yrs rec  10.  MNGoiter  11. Renal insufficiency  12. Hyperparathyroidism/hypercalcemia  13. Vit D def  14. Low HDL  15. STROKE with speech deficit  at .  Now on Plavix.   16.  Right knee replacement 2016, left knee replacement 2018  17.  Urinary incontinence  18. AFib- Dr Schwarz    PAST SURGICAL HISTORY:   1. Hysterectomy  at [facility] secondary to fibroids  2. Partial Thyroidectomy 2005: 80% of Left and All of Right removed  3. Sleep Apnea surgery  4. Appendectomy  5. Tonsillectomy    SOCIAL HISTORY: No tobacco.   . Works at [employer].    FAMILY HISTORY:   1. Mom  from Breast Cancer  2. Dad  from Stomach Cancer secondary to Asbestos    Gen: no distress  EYES: conjunctiva clear, non-icteric, PERRL  ENT: nose clear, nasal mucosa normal, oropharynx clear and moist,  teeth good  NECK:supple, thyroid non-palpable  RESP: effort is good, lungs clear  CV: heart RRR w/o murmur, gallops or rubs; no carotid bruits, no edema  GI: abdomen soft, non-distended, non-tender, no hepatosplenomegaly  MS: gait normal, no clubbing or cyanosis of the digits except she does have incompletely form digits on the left hand  SKIN: no rashes, warm to touch    Labs reviewed            Diagnoses and all orders for this visit:    Routine medical exam, appears to be up-to-date on cancer screening    Encounter for screening mammogram for breast cancer    History of colonic polyps                                                            Additional evaluation and management issues:     Additionally patient has numerous other medical issues to address separate from her physical.      She does follow with Dr. Woods an endocrine for her diabetes.    .  7.9 in 10/22 .  Her A1c did improve down to 6.5 her so but then she said it was back up into 7.9 1/21, last 6.6, 6.7, 7.2 June.  She is on Januvia 50 mg now instead of the previous medicine.  Her PTH level was better.  Her vitamin-D was better at 36 then 20 ??.  She has had a history of hypercalcemia and in the interval apparently her vitamin-D level was very high so she was told about a year ago to stop her weekly 11297 units and so she has been off for the past year.  I discussed reporting the most recent low vitamin-D level at her upcoming Endocrine appointment given the history of hypercalcemia and hyperparathyroidism.  Apparently she might have an overactive parathyroid nodule and she saw the general surgeon who was involved with her prior thyroid surgery and the surgeon did not want to do surgery due to risk of scar tissue and so forth.  We did discuss that restarting vitamin-D might increase calcium absorption and cause her to have hypercalcemia again but I will leave that call up to endocrine especially if no parathyroid surgery is planned.       Her  local cardiologist said the persistent slight hypercalcemia may well be due to the hydrochlorothiazide and it does appear she has off the hydrochlorothiazide now..        Patient does have severe arthritis on the left shoulder and had it replaced- much better.       She is on temazepam which she believes is 15 mg at night and she takes it on occasion and one supply of 30 does last a long time.  We discussed it is controlled substance and refills will .    She is on anticoagulations were obviously can't take anti-inflammatories.        AFib- Dr Schwarz, she saw cardiology       Evaluation and management of all the separate issues will be based on medical decision making.                Assessment and plan:         Uncontrolled type 2 diabetes mellitus with hyperglycemia, keep follow-up with Endocrine but generally stable    Hypertension associated with diabetes, chronic and stable    Stage 3b chronic kidney disease, chronic and stable    Morbid obesity, work on weight loss    Combined hyperlipidemia associated with type 2 diabetes mellitus, chronic and stable    Vitamin D deficiency disease, history of a high vitamin-D level, all the issues above, discuss with Endocrine    MILLIE (obstructive sleep apnea) on cpap, patient has no energy but it has not really hypersomnolence since she does report continuing on CPAP    High-density lipoprotein deficiency, slight improvement on last lipid profile    History of stroke, no interval deficits    Insomnia, unspecified type, medication refilled    Hyperparathyroidism, previously worked up outside the clinic as above    Long-term insulin use    Permanent atrial fibrillation, chronic and stable    Other specified inflammatory spondylopathies, lumbar region, chronic and stable    Other orders  -     temazepam (RESTORIL) 15 mg Cap; Take 1 capsule (15 mg total) by mouth nightly as needed.  The following orders have not been finalized:  -     Cancel: temazepam (RESTORIL) 7.5 MG  Cap

## 2024-07-22 ENCOUNTER — TELEPHONE (OUTPATIENT)
Dept: FAMILY MEDICINE | Facility: CLINIC | Age: 74
End: 2024-07-22
Payer: MEDICARE

## 2024-07-22 NOTE — TELEPHONE ENCOUNTER
----- Message from Merary Eli sent at 7/22/2024  9:04 AM CDT -----  Regarding: Dennis    Type: Patient Call Back     Who called:Kusum     What is the request in detail:calling in regards for the requested office notes for the pts cpap supplies, they are stating they only received the prescription     Can the clinic reply by [facility]? No     Would the patient rather a call back or a response via [facility]? Call back     Best call back number:[phone] option 4     Additional Information:     Thank you.  
Notes have been faxed.  
left

## 2024-07-24 NOTE — TELEPHONE ENCOUNTER
Message left to return call to clinic   Review of Systems   All other systems reviewed and are negative.

## 2024-11-04 LAB — BCS RECOMMENDATION EXT: NORMAL

## 2024-11-12 ENCOUNTER — PATIENT OUTREACH (OUTPATIENT)
Dept: ADMINISTRATIVE | Facility: HOSPITAL | Age: 74
End: 2024-11-12
Payer: MEDICARE

## 2024-11-20 ENCOUNTER — TELEPHONE (OUTPATIENT)
Dept: FAMILY MEDICINE | Facility: CLINIC | Age: 74
End: 2024-11-20
Payer: MEDICARE

## 2024-11-20 DIAGNOSIS — Z12.31 ENCOUNTER FOR SCREENING MAMMOGRAM FOR BREAST CANCER: ICD-10-CM

## 2024-11-20 DIAGNOSIS — R92.1: Primary | ICD-10-CM

## 2024-11-20 NOTE — TELEPHONE ENCOUNTER
Diagnostic mammogram ordered.      ----- Message from Med Assistant Sweet sent at 11/20/2024 10:02 AM CST -----  Type: Patient Call Back    Who called:[facility] [place]    What is the request in detail:pt. Needs a left diagnostic mammogram done .. She recently had a reular mammogram done ..     Can the clinic reply by [facility]?NO    Would the patient rather a call back or a response via [facility]? Yes, call     Best call back number: [phone]

## 2024-11-21 ENCOUNTER — TELEPHONE (OUTPATIENT)
Dept: FAMILY MEDICINE | Facility: CLINIC | Age: 74
End: 2024-11-21
Payer: MEDICARE

## 2024-11-21 DIAGNOSIS — R92.1: Primary | ICD-10-CM

## 2024-11-21 NOTE — TELEPHONE ENCOUNTER
----- Message from Med Assistant Sweet sent at 11/21/2024 11:42 AM CST -----  Type:  Mammogram    Caller is requesting to schedule their annual mammogram appointment.  Order is not listed in EPIC.  Please enter order and contact patient to schedule.  Name of Caller: Self    Where would they [facility] , the order in Epic needs to be outside order..     Would the patient rather a call back or a response via [facility]? Yes, call     Best Call Back Number:  [phone]

## 2024-12-04 ENCOUNTER — PATIENT OUTREACH (OUTPATIENT)
Dept: ADMINISTRATIVE | Facility: HOSPITAL | Age: 74
End: 2024-12-04
Payer: MEDICARE

## 2024-12-09 ENCOUNTER — TELEPHONE (OUTPATIENT)
Dept: FAMILY MEDICINE | Facility: CLINIC | Age: 74
End: 2024-12-09
Payer: MEDICARE

## 2024-12-09 DIAGNOSIS — R92.8 ABNORMAL MAMMOGRAM: Primary | ICD-10-CM

## 2024-12-09 NOTE — TELEPHONE ENCOUNTER
Order already put in. [facility] can see order in Epic.          ----- Message from Mary Mackay sent at 12/9/2024  2:29 PM CST -----  Regarding: Patient Advice                Name of Who is Calling: [facility]:    Who Left The Message: [facility]:      What is the request in detail:       Please provide Orders for left breast stereotactic bioscopy .  Please further advise.    Thank you      Reply by [facility]: NO    [facility]:  [phone](OFFICE)  [phone] (FAX)

## 2024-12-10 ENCOUNTER — TELEPHONE (OUTPATIENT)
Dept: FAMILY MEDICINE | Facility: CLINIC | Age: 74
End: 2024-12-10
Payer: MEDICARE

## 2024-12-10 NOTE — TELEPHONE ENCOUNTER
[facility] at [facility] is head of mammogram dept. Will fax you results of abnormal results and call you personally. She says she will put in orders as need for pt's with [facility] if it's okay with you.

## 2024-12-10 NOTE — TELEPHONE ENCOUNTER
Okay, let us double-check that things get set up since this is a potential breast cancer issue    Please double check and call [facility] to make sure the biopsy is scheduled    Please also ask them how they handle biopsy results since at [facility] the mammogram department calls patient with results and so forth and we will not know here when pathology is ready and so forth.

## 2024-12-23 DIAGNOSIS — D05.92 CARCINOMA IN SITU OF LEFT BREAST, UNSPECIFIED TYPE: Primary | ICD-10-CM

## 2024-12-23 NOTE — PROGRESS NOTES
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  Referral to breast surgery placed and patient told to call if she doesn't hear about an appointment by the end of the week.  Patient and daughter voiced understanding and agreement with plan.  Informed patient that this is usually not aggressive and likely will just need the area excised, but possibly mastectomy depending on what the surgeon evaluation shows.      Delmar Singh MD

## 2024-12-26 ENCOUNTER — TELEPHONE (OUTPATIENT)
Dept: FAMILY MEDICINE | Facility: CLINIC | Age: 74
End: 2024-12-26
Payer: MEDICARE

## 2024-12-26 NOTE — TELEPHONE ENCOUNTER
Dr Singh called pt on 12/23- stat referral put in but apt not set yet so check on the referral process.          Any reports and records, [facility] must be scanned in [facility] ASAP      thanks

## 2024-12-26 NOTE — TELEPHONE ENCOUNTER
----- Message from Rosa sent at 12/23/2024  4:05 PM CST -----  Regarding: FW: self    ----- Message -----  From: Neil Au  Sent: 12/23/2024   3:58 PM CST  To: Dany YE Staff  Subject: self                                               Type: Patient Call Back    Who called: Andrei    What is the request in detail: Left breast biopsy results are in. Positive for CA. Will fax results over to office.     Can the clinic reply by [facility]? No    Would the patient rather a call back or a response via [facility]? Call back    Best call back number: [phone]      Additional Information: [id number]    Thank you.

## 2024-12-27 NOTE — TELEPHONE ENCOUNTER
Referrals say if the referral is external there is nothing they can see are do. The pt has to contact [facility] about appt. I called Sandrine in that dept and left a VM to call us.

## 2025-01-02 ENCOUNTER — TELEPHONE (OUTPATIENT)
Dept: SURGERY | Facility: CLINIC | Age: 75
End: 2025-01-02
Payer: MEDICARE

## 2025-03-09 ENCOUNTER — OFFICE VISIT (OUTPATIENT)
Dept: URGENT CARE | Facility: CLINIC | Age: 75
End: 2025-03-09
Payer: MEDICARE

## 2025-03-09 VITALS
RESPIRATION RATE: 20 BRPM | HEIGHT: 60 IN | DIASTOLIC BLOOD PRESSURE: 70 MMHG | BODY MASS INDEX: 43.39 KG/M2 | SYSTOLIC BLOOD PRESSURE: 128 MMHG | HEART RATE: 66 BPM | TEMPERATURE: 97 F | OXYGEN SATURATION: 94 % | WEIGHT: 221 LBS

## 2025-03-09 DIAGNOSIS — J20.8 ACUTE BACTERIAL BRONCHITIS: Primary | ICD-10-CM

## 2025-03-09 DIAGNOSIS — R05.9 COUGH, UNSPECIFIED TYPE: ICD-10-CM

## 2025-03-09 DIAGNOSIS — B96.89 ACUTE BACTERIAL BRONCHITIS: Primary | ICD-10-CM

## 2025-03-09 PROCEDURE — 99204 OFFICE O/P NEW MOD 45 MIN: CPT | Mod: S$GLB,,, | Performed by: NURSE PRACTITIONER

## 2025-03-09 PROCEDURE — 71046 X-RAY EXAM CHEST 2 VIEWS: CPT | Mod: FY,S$GLB,, | Performed by: RADIOLOGY

## 2025-03-09 RX ORDER — AZITHROMYCIN 250 MG/1
TABLET, FILM COATED ORAL
Qty: 6 TABLET | Refills: 0 | Status: SHIPPED | OUTPATIENT
Start: 2025-03-09 | End: 2025-03-14

## 2025-03-09 RX ORDER — BENZONATATE 200 MG/1
200 CAPSULE ORAL 3 TIMES DAILY PRN
Qty: 20 CAPSULE | Refills: 0 | Status: SHIPPED | OUTPATIENT
Start: 2025-03-09 | End: 2025-03-19

## 2025-03-09 NOTE — PROGRESS NOTES
Subjective:      Patient ID: Andrade Ojeda is a 75 y.o. female.    Vitals:  height is 5' (1.524 m) and weight is 100.2 kg (221 lb). Her oral temperature is 97.2 °F (36.2 °C). Her blood pressure is 128/70 and her pulse is 66. Her respiration is 20 and oxygen saturation is 94% (abnormal).     Chief Complaint: Cough    Pt states having a cough for 2 weeks, unable to cough up the mucus she feels . Denies fever. Has been taking mucinex with mild relief. Hx of MILLIE and uses cpap at night. Reports she always has sinus problems and takes Allegra daily.     Cough  The current episode started 1 to 4 weeks ago. The cough is Productive of sputum. Associated symptoms include nasal congestion. Pertinent negatives include no chills, fever, headaches, postnasal drip, sore throat, sweats or wheezing.       Constitution: Negative for chills and fever.   HENT:  Negative for postnasal drip and sore throat.    Respiratory:  Positive for cough. Negative for wheezing.    Neurological:  Negative for headaches.      Objective:     Physical Exam   Constitutional: She is oriented to person, place, and time. She appears well-developed. She is cooperative.  Non-toxic appearance. She does not appear ill. No distress.   HENT:   Head: Normocephalic and atraumatic.   Ears:   Right Ear: Hearing, tympanic membrane, external ear and ear canal normal.   Left Ear: Hearing, tympanic membrane, external ear and ear canal normal.   Nose: Nose normal. No mucosal edema, rhinorrhea or nasal deformity. No epistaxis. Right sinus exhibits no maxillary sinus tenderness and no frontal sinus tenderness. Left sinus exhibits no maxillary sinus tenderness and no frontal sinus tenderness.   Mouth/Throat: Uvula is midline, oropharynx is clear and moist and mucous membranes are normal. No trismus in the jaw. Normal dentition. No uvula swelling. No oropharyngeal exudate, posterior oropharyngeal edema or posterior oropharyngeal erythema.   Eyes: Conjunctivae and lids  are normal. No scleral icterus.   Neck: Trachea normal and phonation normal. Neck supple. No edema present. No erythema present. No neck rigidity present.   Cardiovascular: Normal rate, regular rhythm, normal heart sounds and normal pulses.   Pulmonary/Chest: Effort normal. No stridor. No respiratory distress. She has decreased breath sounds in the right lower field and the left lower field. She has no wheezes. She has no rhonchi. She has no rales. She exhibits no tenderness.   Abdominal: Normal appearance.   Musculoskeletal: Normal range of motion.         General: No deformity. Normal range of motion.   Neurological: She is alert and oriented to person, place, and time. She exhibits normal muscle tone. Coordination normal.   Skin: Skin is warm, dry, intact, not diaphoretic and not pale.   Psychiatric: Her speech is normal and behavior is normal. Judgment and thought content normal.   Nursing note and vitals reviewed.      Assessment:     1. Acute bacterial bronchitis    2. Cough, unspecified type        Plan:       Acute bacterial bronchitis  -     azithromycin (Z-LENIN) 250 MG tablet; Take 2 tablets by mouth on day 1; Take 1 tablet by mouth on days 2-5  Dispense: 6 tablet; Refill: 0    Cough, unspecified type  -     XR CHEST PA AND LATERAL; Future; Expected date: 03/09/2025  -     benzonatate (TESSALON) 200 MG capsule; Take 1 capsule (200 mg total) by mouth 3 (three) times daily as needed for Cough.  Dispense: 20 capsule; Refill: 0              XR CHEST PA AND LATERAL  Result Date: 3/9/2025  EXAMINATION: XR CHEST PA AND LATERAL CLINICAL HISTORY: Cough, unspecified TECHNIQUE: PA and lateral views of the chest were performed. COMPARISON: None FINDINGS: The cardiomediastinal silhouette is not enlarged noting calcification of the aorta and overall magnification by technique..  There is no pleural effusion.  The trachea is midline.  The lungs are symmetrically expanded bilaterally with coarse interstitial attenuation.   There is left basilar subsegmental atelectasis..  No large focal consolidation seen.  There is no pneumothorax.  The osseous structures are remarkable for degenerative change..  There is partially visualized left shoulder arthroplasty.     1. Mildly coarse interstitial attenuation, accentuated by habitus.  Superimposed edema is a consideration.  No large focal consolidation. Electronically signed by: Carlos Cm MD Date:    03/09/2025 Time:    15:17      Patient Instructions   Azithromycin- antibiotic- take as directed on packaging  Benzonatate- cough suppressant- every 8 hours as needed for cough    To help you feel better:  Use a cool mist humidifier to avoid breathing dry air.  Use hard candy or cough drops to soothe sore throat and cough.  Gargle with salt water (mix 1/2 teaspoon salt with 1 cup warm water) a few times a day.  Spray saltwater mist in each nostril. Any normal saline spray works.  Sip warm liquids to keep your throat moist.  Take warm, steamy showers to help soothe the cough.      Cough may persist for 3-4 weeks. If start running fever, develop chest pains or tightness, please return here or the nearest ED to be evaluated.

## 2025-03-09 NOTE — PATIENT INSTRUCTIONS
Azithromycin- antibiotic- take as directed on packaging  Benzonatate- cough suppressant- every 8 hours as needed for cough    To help you feel better:  Use a cool mist humidifier to avoid breathing dry air.  Use hard candy or cough drops to soothe sore throat and cough.  Gargle with salt water (mix 1/2 teaspoon salt with 1 cup warm water) a few times a day.  Spray saltwater mist in each nostril. Any normal saline spray works.  Sip warm liquids to keep your throat moist.  Take warm, steamy showers to help soothe the cough.      Cough may persist for 3-4 weeks. If start running fever, develop chest pains or tightness, please return here or the nearest ED to be evaluated.

## 2025-04-08 ENCOUNTER — TELEPHONE (OUTPATIENT)
Dept: FAMILY MEDICINE | Facility: CLINIC | Age: 75
End: 2025-04-08

## 2025-04-08 ENCOUNTER — OFFICE VISIT (OUTPATIENT)
Dept: FAMILY MEDICINE | Facility: CLINIC | Age: 75
End: 2025-04-08
Payer: MEDICARE

## 2025-04-08 VITALS
TEMPERATURE: 98 F | OXYGEN SATURATION: 95 % | HEART RATE: 86 BPM | SYSTOLIC BLOOD PRESSURE: 122 MMHG | DIASTOLIC BLOOD PRESSURE: 68 MMHG | HEIGHT: 60 IN | WEIGHT: 218.94 LBS | BODY MASS INDEX: 42.98 KG/M2

## 2025-04-08 DIAGNOSIS — E66.01 MORBID OBESITY: ICD-10-CM

## 2025-04-08 DIAGNOSIS — N18.32 STAGE 3B CHRONIC KIDNEY DISEASE: ICD-10-CM

## 2025-04-08 DIAGNOSIS — I15.2 HYPERTENSION ASSOCIATED WITH DIABETES: ICD-10-CM

## 2025-04-08 DIAGNOSIS — G47.00 INSOMNIA, UNSPECIFIED TYPE: ICD-10-CM

## 2025-04-08 DIAGNOSIS — Z79.4 LONG-TERM INSULIN USE: ICD-10-CM

## 2025-04-08 DIAGNOSIS — E11.65 UNCONTROLLED TYPE 2 DIABETES MELLITUS WITH HYPERGLYCEMIA: ICD-10-CM

## 2025-04-08 DIAGNOSIS — E55.9 VITAMIN D DEFICIENCY DISEASE: ICD-10-CM

## 2025-04-08 DIAGNOSIS — N32.81 OAB (OVERACTIVE BLADDER): ICD-10-CM

## 2025-04-08 DIAGNOSIS — M46.86 OTHER SPECIFIED INFLAMMATORY SPONDYLOPATHIES, LUMBAR REGION: ICD-10-CM

## 2025-04-08 DIAGNOSIS — B37.0 THRUSH: ICD-10-CM

## 2025-04-08 DIAGNOSIS — J02.9 SORE THROAT: Primary | ICD-10-CM

## 2025-04-08 DIAGNOSIS — E11.59 HYPERTENSION ASSOCIATED WITH DIABETES: ICD-10-CM

## 2025-04-08 PROCEDURE — 99999 PR PBB SHADOW E&M-EST. PATIENT-LVL III: CPT | Mod: PBBFAC,,, | Performed by: INTERNAL MEDICINE

## 2025-04-08 PROCEDURE — 3288F FALL RISK ASSESSMENT DOCD: CPT | Mod: CPTII,S$GLB,, | Performed by: INTERNAL MEDICINE

## 2025-04-08 PROCEDURE — G2211 COMPLEX E/M VISIT ADD ON: HCPCS | Mod: S$GLB,,, | Performed by: INTERNAL MEDICINE

## 2025-04-08 PROCEDURE — 3078F DIAST BP <80 MM HG: CPT | Mod: CPTII,S$GLB,, | Performed by: INTERNAL MEDICINE

## 2025-04-08 PROCEDURE — 99214 OFFICE O/P EST MOD 30 MIN: CPT | Mod: S$GLB,,, | Performed by: INTERNAL MEDICINE

## 2025-04-08 PROCEDURE — 1125F AMNT PAIN NOTED PAIN PRSNT: CPT | Mod: CPTII,S$GLB,, | Performed by: INTERNAL MEDICINE

## 2025-04-08 PROCEDURE — 3074F SYST BP LT 130 MM HG: CPT | Mod: CPTII,S$GLB,, | Performed by: INTERNAL MEDICINE

## 2025-04-08 PROCEDURE — 1101F PT FALLS ASSESS-DOCD LE1/YR: CPT | Mod: CPTII,S$GLB,, | Performed by: INTERNAL MEDICINE

## 2025-04-08 RX ORDER — TRAMADOL HYDROCHLORIDE 50 MG/1
50 TABLET ORAL 3 TIMES DAILY
Qty: 90 EACH | Refills: 3 | Status: SHIPPED | OUTPATIENT
Start: 2025-04-08

## 2025-04-08 RX ORDER — TEMAZEPAM 15 MG/1
15 CAPSULE ORAL NIGHTLY PRN
Qty: 30 CAPSULE | Refills: 5 | Status: SHIPPED | OUTPATIENT
Start: 2025-04-08

## 2025-04-08 RX ORDER — SOLIFENACIN SUCCINATE 10 MG/1
10 TABLET, FILM COATED ORAL DAILY
Qty: 90 TABLET | Refills: 4 | Status: SHIPPED | OUTPATIENT
Start: 2025-04-08

## 2025-04-08 RX ORDER — MOMETASONE FUROATE 1 MG/G
CREAM TOPICAL
COMMUNITY
Start: 2025-03-18 | End: 2026-03-18

## 2025-04-08 RX ORDER — NYSTATIN 100000 U/G
CREAM TOPICAL 2 TIMES DAILY
COMMUNITY
Start: 2025-02-24

## 2025-04-08 RX ORDER — PEN NEEDLE, DIABETIC 32GX 5/32"
NEEDLE, DISPOSABLE MISCELLANEOUS
COMMUNITY
Start: 2024-11-25

## 2025-04-08 NOTE — TELEPHONE ENCOUNTER
----- Message from Maame sent at 4/8/2025  1:41 PM CDT -----  Type:  Pharmacy Calling to Clarify an RXName of Caller: Sheng Name: [facility] Prescription Name: traMADoL (ULTRAM) 50 mg tabletWhat do they need to clarify? directionsCan you be contacted via [facility]? No Best Call Back Number: [phone]

## 2025-04-08 NOTE — PROGRESS NOTES
Chief complaint:  Sore throat    Patient is a 75-year-old white female who called and was seen today.  In the interval she had an abnormal mammogram and had breast cancer surgery on February 19th.  She stay with a friend who son had the flu.  She had a cough for three weeks then went to urgent care.  She was given a Z-Nehemiah and after finishing that now for about a week and a half she has had some sore throat on the right side of her throat and some white material on the right side of the tongue and on the gums he had white stuff and they were sore.  Her son apparently had some tongue cancer and had some Peridex mouth irrigation and so she use that twice a day for couple of days and essentially or sore throat is gone today for the 1st time since she made the appointment.  Patient does verbalize anxiety about a sore throat since she did have strep throat once and developed glomerulonephritis.  She has no clinical symptoms of anything like acute pharyngitis or strep throat and she was reassured about that.    She did see an outside urologist for overactive bladder who was given VESIcare but was having some refill issue and so she needs a refill of that.  It seems to be working well.  She was taken off HCTZ and put on Lasix which has increased urinary frequency but she is still not having overactive bladder symptoms so should continue.    Her physical will be due in July     She does request a refill of her Restoril and tramadol.      She does continue to follow with outside endocrine for her diabetes and we reviewed lab work.  I will have her get her next three-month blood work and then follow up with me after for physical so we can review labs again      ROS:   CONST: weight stable. EYES: no vision change. ENT: no sore throat. CV: no chest pain w/ exertion. RESP: no shortness of breath. GI: no nausea, vomiting, diarrhea. No dysphagia. : no urinary issues. MUSCULOSKELETAL: no other new myalgias or arthralgias. SKIN: no  new changes. NEURO: no focal deficits. PSYCH: no new issues. ENDOCRINE: no polyuria. HEME: no lymph nodes. ALLERGY: no general pruritis.    PAST MEDICAL HISTORY:  1. Hypertension  2. Diabetes Mellitus Type II, follows with Dr. Martínez - Endocrinology  3. History of Post Streptococcal Glomerulonephritis  4. Chronic LE Edema  5. Sleep Apnea: 1st sleep study at [facility], 2nd at [facility], 3rd at [place]  6. Obesity  7. Hyperlipidemia  8. Postmenopausal  9. Colonoscopy 2003, Dr. Stewart - [facility], 3 polyps  --3 yrs, scope  w 1 year rec -scope  -5 yrs rec  10.  MNGoiter  11. Renal insufficiency  12. Hyperparathyroidism/hypercalcemia  13. Vit D def  14. Low HDL  15. STROKE with speech deficit  at .  Now on Plavix.   16.  Right knee replacement , left knee replacement   17.  Urinary incontinence  18. AFib- Dr Schwarz    PAST SURGICAL HISTORY:   1. Hysterectomy  at [place] secondary to fibroids  2. Partial Thyroidectomy 2005: 80% of Left and All of Right removed  3. Sleep Apnea surgery  4. Appendectomy  5. Tonsillectomy    SOCIAL HISTORY: No tobacco.   . Works at [employer].    FAMILY HISTORY:   1. Mom  from Breast Cancer  2. Dad  from Stomach Cancer secondary to Asbestos    Gen: no distress  EYES: conjunctiva clear, non-icteric, PERRL  ENT: nose clear, nasal mucosa normal, oropharynx clear and moist, teeth good, mucosa looks normal with no visible thrush, gingiva look good  NECK:supple, thyroid non-palpable  RESP: effort is good, lungs clear  CV: heart RRR w/o murmur, gallops or rubs; no carotid bruits, no edema  GI: abdomen soft, non-distended, non-tender, no hepatosplenomegaly  MS: gait normal, no clubbing or cyanosis of the digits except she does have incompletely form digits on the left hand  SKIN: no rashes, warm to touch    Labs reviewed                                Assessment and plan:      Diagnoses and all orders for this visit:    Sore throat, likely due to thrush under the clinical circumstances and even know not visible today it so classic will have her continue use an antiseptic mouthwash or the Peridex if needed but it appears that it is all gone.  Under future similar circumstances she gets similar symptoms we might treat with nystatin swish and swallow    Thrush, as above    Uncontrolled type 2 diabetes mellitus with hyperglycemia, chronic and stable, follow up with the Endocrine    Hypertension associated with diabetes, chronic and stable    Stage 3b chronic kidney disease chronic and stable    Morbid obesity, chronic and stable    Vitamin D deficiency disease, chronic and stable, follows with Endocrine    Long-term insulin use    OAB (overactive bladder), seen by outside urologist but she requests me to refill her VESIcare which was done    Insomnia, unspecified type, chronic and stable use of Restoril  -     temazepam (RESTORIL) 15 mg Cap; Take 1 capsule (15 mg total) by mouth nightly as needed.    Other specified inflammatory spondylopathies, lumbar region, chronic and stable use of tramadol it appears, follow up with July for physical  -     traMADoL (ULTRAM) 50 mg tablet; Take 1 tablet (50 mg total) by mouth 3 (three) times daily. 1-2 every 6 hrs prn pain    Other orders  -     solifenacin (VESICARE) 10 MG tablet; Take 1 tablet (10 mg total) by mouth once daily.

## 2025-06-16 NOTE — TELEPHONE ENCOUNTER
No care due was identified.  [facility] Embedded Care Due Messages. Reference number: [id number].   6/16/2025 2:58:00 PM CDT

## 2025-06-17 RX ORDER — SOLIFENACIN SUCCINATE 10 MG/1
10 TABLET, FILM COATED ORAL DAILY
Qty: 90 TABLET | Refills: 4 | Status: SHIPPED | OUTPATIENT
Start: 2025-06-17